# Patient Record
Sex: FEMALE | Race: WHITE | Employment: UNEMPLOYED | URBAN - METROPOLITAN AREA
[De-identification: names, ages, dates, MRNs, and addresses within clinical notes are randomized per-mention and may not be internally consistent; named-entity substitution may affect disease eponyms.]

---

## 2017-03-02 ENCOUNTER — OFFICE VISIT (OUTPATIENT)
Dept: CARDIOLOGY | Age: 57
End: 2017-03-02

## 2017-03-02 VITALS
WEIGHT: 183 LBS | HEIGHT: 59 IN | HEART RATE: 76 BPM | BODY MASS INDEX: 36.89 KG/M2 | RESPIRATION RATE: 12 BRPM | DIASTOLIC BLOOD PRESSURE: 60 MMHG | SYSTOLIC BLOOD PRESSURE: 110 MMHG

## 2017-03-02 DIAGNOSIS — Z98.890 S/P CARDIAC CATHETERIZATION: Primary | ICD-10-CM

## 2017-03-02 DIAGNOSIS — I51.7 CARDIOMEGALY: ICD-10-CM

## 2017-03-02 DIAGNOSIS — Z87.891 HISTORY OF TOBACCO ABUSE: ICD-10-CM

## 2017-03-02 DIAGNOSIS — I42.9 CARDIOMYOPATHY (HCC): ICD-10-CM

## 2017-03-02 DIAGNOSIS — Z95.810 ICD (IMPLANTABLE CARDIOVERTER-DEFIBRILLATOR) IN PLACE: ICD-10-CM

## 2017-03-02 DIAGNOSIS — J44.9 CHRONIC OBSTRUCTIVE PULMONARY DISEASE, UNSPECIFIED COPD TYPE (HCC): ICD-10-CM

## 2017-03-02 PROCEDURE — 99213 OFFICE O/P EST LOW 20 MIN: CPT | Performed by: INTERNAL MEDICINE

## 2017-03-02 ASSESSMENT — ENCOUNTER SYMPTOMS
COUGH: 0
CHEST TIGHTNESS: 0
APNEA: 0
COLOR CHANGE: 0
SHORTNESS OF BREATH: 0

## 2017-03-15 RX ORDER — LISINOPRIL 10 MG/1
10 TABLET ORAL DAILY
Qty: 90 TABLET | Refills: 3 | Status: SHIPPED | OUTPATIENT
Start: 2017-03-15 | End: 2018-02-20 | Stop reason: SDUPTHER

## 2017-03-15 RX ORDER — FUROSEMIDE 40 MG/1
40 TABLET ORAL 2 TIMES DAILY
Qty: 180 TABLET | Refills: 3 | Status: SHIPPED | OUTPATIENT
Start: 2017-03-15 | End: 2018-02-16 | Stop reason: SDUPTHER

## 2017-03-15 RX ORDER — SPIRONOLACTONE 25 MG/1
25 TABLET ORAL DAILY
Qty: 90 TABLET | Refills: 3 | Status: SHIPPED | OUTPATIENT
Start: 2017-03-15 | End: 2018-02-16 | Stop reason: SDUPTHER

## 2017-03-15 RX ORDER — CARVEDILOL 12.5 MG/1
12.5 TABLET ORAL 2 TIMES DAILY WITH MEALS
Qty: 180 TABLET | Refills: 3 | Status: SHIPPED | OUTPATIENT
Start: 2017-03-15 | End: 2017-07-17 | Stop reason: SDUPTHER

## 2017-07-17 RX ORDER — CARVEDILOL 12.5 MG/1
12.5 TABLET ORAL 2 TIMES DAILY WITH MEALS
Qty: 180 TABLET | Refills: 3 | Status: SHIPPED | OUTPATIENT
Start: 2017-07-17 | End: 2018-02-12 | Stop reason: SDUPTHER

## 2017-11-02 ENCOUNTER — OFFICE VISIT (OUTPATIENT)
Dept: CARDIOLOGY | Age: 57
End: 2017-11-02

## 2017-11-02 VITALS
HEIGHT: 59 IN | WEIGHT: 185 LBS | BODY MASS INDEX: 37.29 KG/M2 | HEART RATE: 68 BPM | SYSTOLIC BLOOD PRESSURE: 124 MMHG | DIASTOLIC BLOOD PRESSURE: 80 MMHG | RESPIRATION RATE: 12 BRPM

## 2017-11-02 DIAGNOSIS — R53.83 OTHER FATIGUE: Primary | ICD-10-CM

## 2017-11-02 DIAGNOSIS — Z95.0 S/P PLACEMENT OF CARDIAC PACEMAKER: ICD-10-CM

## 2017-11-02 DIAGNOSIS — Z98.890 S/P CARDIAC CATHETERIZATION: ICD-10-CM

## 2017-11-02 DIAGNOSIS — Z95.810 S/P INTERNAL CARDIAC DEFIBRILLATOR PROCEDURE: ICD-10-CM

## 2017-11-02 PROCEDURE — 99213 OFFICE O/P EST LOW 20 MIN: CPT | Performed by: INTERNAL MEDICINE

## 2017-11-02 ASSESSMENT — ENCOUNTER SYMPTOMS
SHORTNESS OF BREATH: 1
CHEST TIGHTNESS: 0
COLOR CHANGE: 0
APNEA: 0
COUGH: 0

## 2017-11-02 NOTE — PROGRESS NOTES
History:   reports that she quit smoking about 2 years ago. She started smoking about 37 years ago. She has a 25.00 pack-year smoking history. She has never used smokeless tobacco. She reports that she does not drink alcohol or use drugs. Surgical History:  Past Surgical History:   Procedure Laterality Date    CARDIAC DEFIBRILLATOR PLACEMENT      PACEMAKER INSERTION         Family History:  Family history is unknown by patient. Current Medications:    Current Outpatient Prescriptions:     carvedilol (COREG) 12.5 MG tablet, Take 1 tablet by mouth 2 times daily (with meals), Disp: 180 tablet, Rfl: 3    furosemide (LASIX) 40 MG tablet, Take 1 tablet by mouth 2 times daily, Disp: 180 tablet, Rfl: 3    spironolactone (ALDACTONE) 25 MG tablet, Take 1 tablet by mouth daily, Disp: 90 tablet, Rfl: 3    lisinopril (PRINIVIL;ZESTRIL) 10 MG tablet, Take 1 tablet by mouth daily, Disp: 90 tablet, Rfl: 3    aspirin 81 MG tablet, Take 81 mg by mouth daily, Disp: , Rfl:     EPINEPHrine (EPIPEN 2-DAR) 0.3 MG/0.3ML SOAJ injection, Inject 0.3 mLs into the skin once as needed (allergic reaction) Use as directed for allergic reaction, Disp: 2 each, Rfl: 0      Review of Systems:  Review of Systems   Constitutional: Negative for appetite change, diaphoresis and fatigue. Respiratory: Positive for shortness of breath. Negative for apnea, cough and chest tightness. On exertion   Cardiovascular: Negative for chest pain and palpitations. Musculoskeletal: Negative for gait problem. Skin: Negative for color change, pallor, rash and wound. Neurological: Negative for dizziness, syncope, weakness, light-headedness, numbness and headaches. Psychiatric/Behavioral: Negative for agitation. The patient is not nervous/anxious. All other systems reviewed and are negative.         Objective  Vitals:    11/02/17 1115   BP: 124/80   Pulse: 68   Resp: 12   Weight: 185 lb (83.9 kg)   Height: 4' 11\" (1.499 m)         Physical Exam:  Physical Exam        Assessment/Orders:     ICD-10-CM ICD-9-CM    1. Other fatigue R53.83 780.79 Internal Referral to Pacemaker Clinic   2. S/P cardiac catheterization Z98.890 V45.89    3. S/P placement of cardiac pacemaker Z95.0 V45.01 Internal Referral to Pacemaker Clinic   4. S/P internal cardiac defibrillator procedure Z95.810 V45.02 Internal Referral to Pacemaker Clinic       No orders of the defined types were placed in this encounter. There are no discontinued medications. Orders Placed This Encounter   Procedures    Internal Referral to Pacemaker Clinic     Referral Priority:   Routine     Referral Type:   Consult for Advice and Opinion     Referral Reason:   Specialty Services Required     Requested Specialty:   Cardiology     Number of Visits Requested:   1         Plan:  1. Patient to see pacemaker clinic  2. See me in 6 months.  > I will continue to monitor patient clinically, if symptoms develop or worsen, they are to let me know ASAP or head to the nearest emergeny room. > Follow up as discussed or call office sooner if needed.  > If refills are needed after appointment contact pharmacy.         Electronically signed by: Rosalva Perera MD  11/2/2017 11:26 AM

## 2018-02-12 RX ORDER — CARVEDILOL 12.5 MG/1
12.5 TABLET ORAL 2 TIMES DAILY WITH MEALS
Qty: 180 TABLET | Refills: 1 | Status: SHIPPED | OUTPATIENT
Start: 2018-02-12 | End: 2018-07-19 | Stop reason: SDUPTHER

## 2018-02-12 NOTE — TELEPHONE ENCOUNTER
From: Sal Louis  Sent: 2/12/2018 2:49 PM EST  Subject: Medication Renewal Request    Sal Louis would like a refill of the following medications:  carvedilol (COREG) 12.5 MG tablet Selin Kitchen MD]    Preferred pharmacy: Other - [facility] mail order    Comment:  I need to change the above meds scripts sent to [facility] please. . Thank you Sal Louis    Medication renewals requested in this message routed separately:  lisinopril (PRINIVIL;ZESTRIL) 10 MG tablet Selin Kitchen MD]

## 2018-02-16 RX ORDER — FUROSEMIDE 40 MG/1
40 TABLET ORAL 2 TIMES DAILY
Qty: 180 TABLET | Refills: 2 | Status: SHIPPED | OUTPATIENT
Start: 2018-02-16 | End: 2018-03-02 | Stop reason: SDUPTHER

## 2018-02-16 RX ORDER — SPIRONOLACTONE 25 MG/1
25 TABLET ORAL DAILY
Qty: 90 TABLET | Refills: 2 | Status: SHIPPED | OUTPATIENT
Start: 2018-02-16 | End: 2018-03-02 | Stop reason: SDUPTHER

## 2018-02-20 RX ORDER — LISINOPRIL 10 MG/1
10 TABLET ORAL DAILY
Qty: 90 TABLET | Refills: 3 | Status: SHIPPED | OUTPATIENT
Start: 2018-02-20 | End: 2018-10-02 | Stop reason: SDUPTHER

## 2018-02-26 RX ORDER — LISINOPRIL 10 MG/1
10 TABLET ORAL DAILY
Qty: 90 TABLET | Refills: 3 | Status: SHIPPED | OUTPATIENT
Start: 2018-02-26

## 2018-02-26 NOTE — TELEPHONE ENCOUNTER
From: Kailey Riggs  Sent: 2/12/2018 2:49 PM EST  Subject: Medication Renewal Request    Kailey Riggs would like a refill of the following medications:  lisinopril (PRINIVIL;ZESTRIL) 10 MG tablet Rose Marie Ramires MD]    Preferred pharmacy: Other - [facility] mail order    Comment:  I need to change the above meds scripts sent to [facility] please. . Thank you Kailey Riggs    Medication renewals requested in this message routed separately:  carvedilol (COREG) 12.5 MG tablet Rose Marie Ramires MD]

## 2018-03-02 RX ORDER — FUROSEMIDE 40 MG/1
40 TABLET ORAL 2 TIMES DAILY
Qty: 180 TABLET | Refills: 2 | Status: SHIPPED | OUTPATIENT
Start: 2018-03-02

## 2018-03-02 RX ORDER — SPIRONOLACTONE 25 MG/1
25 TABLET ORAL DAILY
Qty: 90 TABLET | Refills: 2 | Status: SHIPPED | OUTPATIENT
Start: 2018-03-02 | End: 2018-10-02 | Stop reason: SDUPTHER

## 2018-03-02 RX ORDER — EPINEPHRINE 0.3 MG/.3ML
0.3 INJECTION SUBCUTANEOUS
Qty: 2 EACH | Refills: 0 | OUTPATIENT
Start: 2018-03-02 | End: 2018-03-02

## 2018-03-02 NOTE — TELEPHONE ENCOUNTER
From: Coty Alicia  Sent: 3/1/2018 5:11 PM EST  Subject: Medication Renewal Request    Coty Alicia would like a refill of the following medications:     EPINEPHrine (EPIPEN 2-DAR) 0.3 MG/0.3ML SOAJ injection Waunita Pellet, DO]    Preferred pharmacy: [address] [place] [address] [phone]    Comment:  I need refills on these meds but [facility]. The fax number there is [phone] or call [phone]. .thank you.     Medication renewals requested in this message routed separately:     furosemide (LASIX) 40 MG tablet Tila Winters MD]     spironolactone (ALDACTONE) 25 MG tablet Tila Winters MD]

## 2018-07-19 RX ORDER — CARVEDILOL 12.5 MG/1
TABLET ORAL
Qty: 180 TABLET | Refills: 3 | Status: SHIPPED | OUTPATIENT
Start: 2018-07-19

## 2018-10-02 RX ORDER — FUROSEMIDE 40 MG/1
TABLET ORAL
Qty: 180 TABLET | Refills: 3 | Status: SHIPPED | OUTPATIENT
Start: 2018-10-02

## 2018-10-02 RX ORDER — SPIRONOLACTONE 25 MG/1
25 TABLET ORAL DAILY
Qty: 90 TABLET | Refills: 3 | Status: SHIPPED | OUTPATIENT
Start: 2018-10-02

## 2019-04-10 RX ORDER — LISINOPRIL 10 MG/1
TABLET ORAL
Qty: 90 TABLET | Refills: 3 | OUTPATIENT
Start: 2019-04-10

## 2019-06-12 ENCOUNTER — TELEPHONE (OUTPATIENT)
Dept: ADMINISTRATIVE | Age: 59
End: 2019-06-12

## 2019-06-12 NOTE — TELEPHONE ENCOUNTER
Patient received a robo/outreach call. Patient is undecided at this time, advised patient to please contact us if she is interested in scheduling with [facility].

## 2023-03-06 LAB
ANION GAP IN SER/PLAS: 12 MMOL/L (ref 10–20)
CALCIUM (MG/DL) IN SER/PLAS: 9.8 MG/DL (ref 8.6–10.3)
CARBON DIOXIDE, TOTAL (MMOL/L) IN SER/PLAS: 24 MMOL/L (ref 21–32)
CHLORIDE (MMOL/L) IN SER/PLAS: 102 MMOL/L (ref 98–107)
CREATININE (MG/DL) IN SER/PLAS: 0.89 MG/DL (ref 0.5–1.05)
GFR FEMALE: 72 ML/MIN/1.73M2
GLUCOSE (MG/DL) IN SER/PLAS: 167 MG/DL (ref 74–99)
POTASSIUM (MMOL/L) IN SER/PLAS: 4.1 MMOL/L (ref 3.5–5.3)
SODIUM (MMOL/L) IN SER/PLAS: 134 MMOL/L (ref 136–145)
UREA NITROGEN (MG/DL) IN SER/PLAS: 18 MG/DL (ref 6–23)

## 2023-07-10 ENCOUNTER — OFFICE VISIT (OUTPATIENT)
Dept: PRIMARY CARE | Facility: CLINIC | Age: 63
End: 2023-07-10
Payer: MEDICARE

## 2023-07-10 VITALS
BODY MASS INDEX: 35.56 KG/M2 | WEIGHT: 176.4 LBS | DIASTOLIC BLOOD PRESSURE: 78 MMHG | TEMPERATURE: 97.7 F | SYSTOLIC BLOOD PRESSURE: 122 MMHG | HEIGHT: 59 IN | RESPIRATION RATE: 16 BRPM | OXYGEN SATURATION: 96 % | HEART RATE: 97 BPM

## 2023-07-10 DIAGNOSIS — G56.01 CARPAL TUNNEL SYNDROME OF RIGHT WRIST: Primary | ICD-10-CM

## 2023-07-10 DIAGNOSIS — M25.531 ARTHRALGIA OF RIGHT WRIST: ICD-10-CM

## 2023-07-10 DIAGNOSIS — M25.531 WRIST PAIN, ACUTE, RIGHT: ICD-10-CM

## 2023-07-10 DIAGNOSIS — E66.9 CLASS 2 OBESITY WITH BODY MASS INDEX (BMI) OF 36.0 TO 36.9 IN ADULT, UNSPECIFIED OBESITY TYPE, UNSPECIFIED WHETHER SERIOUS COMORBIDITY PRESENT: ICD-10-CM

## 2023-07-10 PROCEDURE — 99213 OFFICE O/P EST LOW 20 MIN: CPT | Performed by: NURSE PRACTITIONER

## 2023-07-10 RX ORDER — DAPAGLIFLOZIN 10 MG/1
10 TABLET, FILM COATED ORAL DAILY
COMMUNITY
Start: 2023-06-26 | End: 2024-02-05

## 2023-07-10 RX ORDER — PREDNISONE 10 MG/1
TABLET ORAL
Qty: 30 TABLET | Refills: 0 | Status: SHIPPED | OUTPATIENT
Start: 2023-07-10 | End: 2023-07-20

## 2023-07-10 RX ORDER — SPIRONOLACTONE 50 MG/1
50 TABLET, FILM COATED ORAL DAILY
COMMUNITY
End: 2023-10-19 | Stop reason: WASHOUT

## 2023-07-10 RX ORDER — SACUBITRIL AND VALSARTAN 24; 26 MG/1; MG/1
1 TABLET, FILM COATED ORAL 2 TIMES DAILY
COMMUNITY
Start: 2020-03-26 | End: 2024-04-29 | Stop reason: SDUPTHER

## 2023-07-10 RX ORDER — SPIRONOLACTONE 25 MG/1
25 TABLET ORAL DAILY
COMMUNITY
Start: 2022-07-13 | End: 2024-04-29 | Stop reason: SDUPTHER

## 2023-07-10 RX ORDER — FUROSEMIDE 40 MG/1
40 TABLET ORAL DAILY
COMMUNITY
End: 2024-04-29 | Stop reason: SDUPTHER

## 2023-07-10 RX ORDER — DICLOFENAC SODIUM 75 MG/1
75 TABLET, DELAYED RELEASE ORAL 2 TIMES DAILY PRN
Qty: 60 TABLET | Refills: 0 | Status: SHIPPED | OUTPATIENT
Start: 2023-07-10 | End: 2023-09-08

## 2023-07-10 RX ORDER — FLUCONAZOLE 150 MG/1
150 TABLET ORAL NIGHTLY
COMMUNITY
Start: 2023-06-16 | End: 2024-04-15 | Stop reason: SDUPTHER

## 2023-07-10 RX ORDER — EPINEPHRINE 0.3 MG/.3ML
1 INJECTION SUBCUTANEOUS AS NEEDED
COMMUNITY
Start: 2020-07-29

## 2023-07-10 RX ORDER — CARVEDILOL 12.5 MG/1
12.5 TABLET ORAL 2 TIMES DAILY
COMMUNITY
End: 2024-04-11 | Stop reason: SDUPTHER

## 2023-07-10 ASSESSMENT — PATIENT HEALTH QUESTIONNAIRE - PHQ9
1. LITTLE INTEREST OR PLEASURE IN DOING THINGS: NOT AT ALL
SUM OF ALL RESPONSES TO PHQ9 QUESTIONS 1 AND 2: 0
2. FEELING DOWN, DEPRESSED OR HOPELESS: NOT AT ALL

## 2023-07-10 ASSESSMENT — ENCOUNTER SYMPTOMS
DEPRESSION: 0
LOSS OF SENSATION IN FEET: 0
BACK PAIN: 1
OCCASIONAL FEELINGS OF UNSTEADINESS: 0

## 2023-07-10 NOTE — PATIENT INSTRUCTIONS
DISCHARGE SUMMARY:   Patient was seen and examined. Diagnosis, treatment, treatment options, and possible complications of today's illness discussed and explained to patient. Patient to buy and use Cockup wrist splint. Patient to take medication/s associated with this visit. Advised to come back if with worsening or persistent symptoms. Patient verbalized understanding of plan of care.    Patient to come back in 7 - 10 days if needed for worsening symptoms.

## 2023-07-10 NOTE — PROGRESS NOTES
"Subjective   Patient ID: Sandee York is a 63 y.o. female who presents for Back Pain and Arm Pain.    Back Pain  This is a new problem. The current episode started today. The problem occurs constantly. The problem is unchanged. The pain is present in the sacro-iliac. The quality of the pain is described as stabbing. The pain does not radiate. The pain is at a severity of 7/10. The pain is moderate. The pain is The same all the time. The symptoms are aggravated by bending and twisting. She has tried ice, muscle relaxant, NSAIDs and analgesics for the symptoms. The treatment provided no relief.   Arm Pain   The incident occurred 12 to 24 hours ago. The incident occurred at home. The injury mechanism is unknown. The pain is present in the right hand and right forearm. The quality of the pain is described as aching. The pain radiates to the right arm and right hand. The pain is at a severity of 8/10. The pain is severe. The pain has been Constant since the incident. She has tried acetaminophen and NSAIDs for the symptoms. The treatment provided no relief.    Back Pain    Arm Pain   The incident occurred 12 to 24 hours ago. The incident occurred at home. There was no injury mechanism. The pain is present in the right forearm, right wrist and upper right arm. The quality of the pain is described as aching. The pain radiates to the right arm and right hand. The pain is at a severity of 8/10. The pain is severe. The pain has been Constant since the incident. Nothing aggravates the symptoms. The treatment provided no relief.       Review of Systems   Musculoskeletal:  Positive for back pain.       Objective   /78   Pulse 97   Temp 36.5 °C (97.7 °F) (Temporal)   Resp 16   Ht 1.486 m (4' 10.5\")   Wt 80 kg (176 lb 6.4 oz)   SpO2 96%   BMI 36.24 kg/m²     Physical Exam    Assessment/Plan          "

## 2023-07-10 NOTE — PROGRESS NOTES
Subjective   Patient ID: Sandee York is a 63 y.o. female who is with complaint of pain and tenderness on the right wrist and forearm.     HPI  Patient is a 63 y.o. female who CONSULTED AT [facility] today. Patient is with complaints of pain and tenderness on the right wrist and forearm. Patient states symptoms has been going on for 2 days. Patient has OTC medication and muscle relaxant for relief of symptoms. Patient states the pain is on the palmar side of his right wrist joint, achy in character, intermittent, aggravated by movement, and radiating to the forearm (anterior aspect). she denies fever, chills, paresthesia, paralysis, nor change in the color of the skin or nails of involved extremity.     Review of Systems  General: no weight loss, generally healthy, no fatigue  Head:  no headaches / sinus pain, no vertigo, no injury  Eyes: no diplopia, no tearing, no pain,   Ears: no change in hearing, no tinnitus, no bleeding, no vertigo  Mouth:  no dental difficulties, no gingival bleeding, no sore throat, no loss of sense of taste  Nose: no congestion, no  discharge, no bleeding, no obstruction, no loss of sense of smell  Neck: no stiffness, no pain, no tenderness, no masses, no bruit  Pulmonary: no dyspnea, no wheezing, no hemoptysis, no cough  Cardiovascular: no chest pain, no palpitations, no syncope, no orthopnea  Gastrointestinal: no change in appetite, no dysphagia, no abdominal pains, no diarrhea, no emesis, no melena  Genito Urinary: no dysuria, no urinary urgency, no nocturia, no incontinence, no change in nature of urine  Musculoskeletal: (+) pain and tenderness on the right wrist and forearm, no limitation of range of motion, no paresthesia, no numbness  Constitutional: no fever, no chills, no night sweats    Objective   Physical Exam  General: ambulatory, in no acute distress  Head: normocephalic, no lesions  Neck: supple, no masses, no bruits  Musculoskeletal: no  limitation of range of motion, no paralysis, no deformity;   Extremities: RIGHT UE: (+) direct tenderness on anterior wrist area, (+) Tinel's sign, (+) Phalen's test full and equal peripheral pulses, no edema, < 2 seconds capillary refill on all toes    Assessment/Plan   Problem List Items Addressed This Visit    None  Visit Diagnoses       Carpal tunnel syndrome of right wrist    -  Primary    Relevant Medications    predniSONE (Deltasone) 10 mg tablet    diclofenac (Voltaren) 75 mg EC tablet    Arthralgia of right wrist        Relevant Medications    predniSONE (Deltasone) 10 mg tablet    diclofenac (Voltaren) 75 mg EC tablet    Wrist pain, acute, right        Relevant Medications    predniSONE (Deltasone) 10 mg tablet    diclofenac (Voltaren) 75 mg EC tablet        DISCHARGE SUMMARY:   Patient was seen and examined. Diagnosis, treatment, treatment options, and possible complications of today's illness discussed and explained to patient. Patient to buy and use Cockup wrist splint. Patient to take medication/s associated with this visit. Advised to come back if with worsening or persistent symptoms. Patient verbalized understanding of plan of care.    Patient to come back in 7 - 10 days if needed for worsening symptoms.

## 2023-09-19 PROBLEM — N18.30 CKD (CHRONIC KIDNEY DISEASE) STAGE 3, GFR 30-59 ML/MIN (MULTI): Status: ACTIVE | Noted: 2023-09-19

## 2023-09-19 PROBLEM — I42.8 OTHER CARDIOMYOPATHIES (MULTI): Status: ACTIVE | Noted: 2018-03-15

## 2023-09-19 PROBLEM — I50.22 CHRONIC SYSTOLIC HEART FAILURE (MULTI): Status: ACTIVE | Noted: 2023-09-19

## 2023-09-19 PROBLEM — I51.9 MILD DIASTOLIC DYSFUNCTION: Status: ACTIVE | Noted: 2023-09-19

## 2023-09-19 PROBLEM — E78.5 HYPERLIPIDEMIA: Status: ACTIVE | Noted: 2023-09-19

## 2023-09-19 PROBLEM — R73.01 IMPAIRED FASTING GLUCOSE: Status: ACTIVE | Noted: 2023-09-19

## 2023-09-19 PROBLEM — R60.9 SWELLING: Status: ACTIVE | Noted: 2023-09-19

## 2023-09-19 PROBLEM — I10 HTN (HYPERTENSION): Status: ACTIVE | Noted: 2023-09-19

## 2023-09-19 PROBLEM — I42.8 NONISCHEMIC CARDIOMYOPATHY (MULTI): Status: ACTIVE | Noted: 2023-09-19

## 2023-09-19 PROBLEM — E66.812 OBESITY, CLASS II, BMI 35-39.9: Status: ACTIVE | Noted: 2018-07-02

## 2023-09-19 PROBLEM — I50.9 CHF (CONGESTIVE HEART FAILURE) (MULTI): Status: ACTIVE | Noted: 2023-09-19

## 2023-09-19 PROBLEM — I44.7 LBBB (LEFT BUNDLE BRANCH BLOCK): Status: ACTIVE | Noted: 2023-09-19

## 2023-09-19 PROBLEM — D06.9 CIN III (CERVICAL INTRAEPITHELIAL NEOPLASIA GRADE III) WITH SEVERE DYSPLASIA: Status: ACTIVE | Noted: 2018-07-13

## 2023-09-19 PROBLEM — I25.10 CAD (CORONARY ARTERY DISEASE), NATIVE CORONARY ARTERY: Status: ACTIVE | Noted: 2023-09-19

## 2023-09-19 PROBLEM — Z87.891 FORMER SMOKER: Status: ACTIVE | Noted: 2023-09-19

## 2023-09-19 PROBLEM — Z95.0 PACEMAKER: Status: ACTIVE | Noted: 2023-09-19

## 2023-09-19 PROBLEM — B35.2 TINEA MANUUM: Status: ACTIVE | Noted: 2023-09-19

## 2023-09-19 PROBLEM — Z95.810 PRESENCE OF COMBINATION INTERNAL CARDIAC DEFIBRILLATOR (ICD) AND PACEMAKER: Status: ACTIVE | Noted: 2023-09-19

## 2023-09-19 PROBLEM — R91.1 LUNG NODULE: Status: ACTIVE | Noted: 2023-09-19

## 2023-09-19 PROBLEM — Z95.810 BIVENTRICULAR ICD (IMPLANTABLE CARDIOVERTER-DEFIBRILLATOR) IN PLACE: Status: ACTIVE | Noted: 2023-09-19

## 2023-09-19 PROBLEM — R73.03 PRE-DIABETES: Status: ACTIVE | Noted: 2023-09-19

## 2023-09-19 PROBLEM — R60.9 WATER RETENTION: Status: ACTIVE | Noted: 2023-09-19

## 2023-09-19 PROBLEM — E78.2 COMBINED HYPERLIPIDEMIA: Status: ACTIVE | Noted: 2023-09-19

## 2023-09-19 PROBLEM — H60.90 OTITIS EXTERNA: Status: ACTIVE | Noted: 2023-09-19

## 2023-09-19 PROBLEM — I25.2 HISTORY OF MYOCARDIAL INFARCTION: Status: ACTIVE | Noted: 2023-09-19

## 2023-09-19 PROBLEM — E66.9 OBESITY, CLASS II, BMI 35-39.9: Status: ACTIVE | Noted: 2018-07-02

## 2023-09-19 RX ORDER — ASPIRIN 81 MG/1
1 TABLET ORAL DAILY
COMMUNITY

## 2023-09-19 RX ORDER — LISINOPRIL 10 MG/1
1 TABLET ORAL DAILY
COMMUNITY
Start: 2018-02-26 | End: 2024-01-30 | Stop reason: WASHOUT

## 2023-09-19 RX ORDER — ALBUTEROL SULFATE 0.83 MG/ML
2.5 SOLUTION RESPIRATORY (INHALATION)
COMMUNITY
Start: 2015-09-13 | End: 2023-10-19 | Stop reason: WASHOUT

## 2023-09-19 RX ORDER — ATORVASTATIN CALCIUM 80 MG/1
1 TABLET, FILM COATED ORAL NIGHTLY
COMMUNITY
Start: 2019-02-05 | End: 2023-10-19 | Stop reason: WASHOUT

## 2023-10-19 ENCOUNTER — OFFICE VISIT (OUTPATIENT)
Dept: CARDIOLOGY | Facility: CLINIC | Age: 63
End: 2023-10-19
Payer: MEDICARE

## 2023-10-19 VITALS
OXYGEN SATURATION: 96 % | BODY MASS INDEX: 33.87 KG/M2 | HEIGHT: 59 IN | SYSTOLIC BLOOD PRESSURE: 110 MMHG | DIASTOLIC BLOOD PRESSURE: 80 MMHG | HEART RATE: 75 BPM | WEIGHT: 168 LBS

## 2023-10-19 DIAGNOSIS — I44.7 LBBB (LEFT BUNDLE BRANCH BLOCK): ICD-10-CM

## 2023-10-19 DIAGNOSIS — I25.10 CORONARY ARTERY DISEASE INVOLVING NATIVE CORONARY ARTERY OF NATIVE HEART WITHOUT ANGINA PECTORIS: ICD-10-CM

## 2023-10-19 DIAGNOSIS — I50.22 CHRONIC SYSTOLIC HEART FAILURE (MULTI): Primary | ICD-10-CM

## 2023-10-19 PROCEDURE — 1160F RVW MEDS BY RX/DR IN RCRD: CPT | Performed by: INTERNAL MEDICINE

## 2023-10-19 PROCEDURE — 99214 OFFICE O/P EST MOD 30 MIN: CPT | Performed by: INTERNAL MEDICINE

## 2023-10-19 PROCEDURE — 3079F DIAST BP 80-89 MM HG: CPT | Performed by: INTERNAL MEDICINE

## 2023-10-19 PROCEDURE — 1036F TOBACCO NON-USER: CPT | Performed by: INTERNAL MEDICINE

## 2023-10-19 PROCEDURE — 1159F MED LIST DOCD IN RCRD: CPT | Performed by: INTERNAL MEDICINE

## 2023-10-19 PROCEDURE — 3008F BODY MASS INDEX DOCD: CPT | Performed by: INTERNAL MEDICINE

## 2023-10-19 PROCEDURE — 3074F SYST BP LT 130 MM HG: CPT | Performed by: INTERNAL MEDICINE

## 2023-10-19 ASSESSMENT — ENCOUNTER SYMPTOMS
OCCASIONAL FEELINGS OF UNSTEADINESS: 0
LOSS OF SENSATION IN FEET: 0
DEPRESSION: 0

## 2023-10-19 NOTE — PROGRESS NOTES
Name : Sandee York   : 1960   MRN : [id number]   ENC Date : 10/19/2023      Assessment and Plan:  Chronic systolic heart failure: LVEF has normalized.  Patient is NYHA class II at worst.  Volume status is normal.  No need to add any medications.  Continue current medical regimen.  Coronary artery disease: No angina.  No need for stress testing.  Patient did not tolerate statin as she believes it raised her blood sugar.  She does not want to try a different statin at this time.  Disp: RTO in 6 months    HPI:  Patient returns today doing reasonably well.  She has some mild dyspnea with exertion but no chest discomfort.  No syncopal events.  No TIA or CVA-like symptoms.  No orthopnea nor PND.    Problem list overview:   Patient Active Problem List   Diagnosis    CAD (coronary artery disease), native coronary artery    Cardiomegaly    Chronic systolic heart failure (CMS/HCC)    JENNY III (cervical intraepithelial neoplasia grade III) with severe dysplasia    CKD (chronic kidney disease) stage 3, GFR 30-59 ml/min (CMS/HCC)    COPD (chronic obstructive pulmonary disease) (CMS/HCC)    History of myocardial infarction    HTN (hypertension)    Combined hyperlipidemia    Hyperlipidemia    Impaired fasting glucose    CHF (congestive heart failure) (CMS/HCC)    LBBB (left bundle branch block)    Lung nodule    Mild diastolic dysfunction    Obesity, Class II, BMI 35-39.9    Other cardiomyopathies (CMS/HCC)    Otitis externa    Pacemaker    Presence of combination internal cardiac defibrillator (ICD) and pacemaker    Pre-diabetes    Tinea    Onychomycosis    Tinea manuum    Urge incontinence    Swelling    Water retention    Biventricular ICD (implantable cardioverter-defibrillator) in place    Former smoker    Nonischemic cardiomyopathy (CMS/HCC)       Meds:   Current Outpatient Medications on File Prior to Visit   Medication Sig Dispense Refill    aspirin 81 mg EC tablet Take 1 tablet (81 mg) by mouth once daily.    "   carvedilol (Coreg) 12.5 mg tablet Take 1 tablet (12.5 mg) by mouth 2 times a day.      Entresto 24-26 mg tablet Take 1 tablet by mouth 2 times a day.      EPINEPHrine 0.3 mg/0.3 mL injection syringe Inject 0.3 mL (0.3 mg) as directed if needed.      Farxiga 10 mg Take 1 tablet (10 mg) by mouth once daily.      fluconazole (Diflucan) 150 mg tablet Take 1 tablet (150 mg) by mouth once daily at bedtime.      furosemide (Lasix) 40 mg tablet Take 1 tablet (40 mg) by mouth once daily.      lisinopril 10 mg tablet Take 1 tablet (10 mg) by mouth once daily.      spironolactone (Aldactone) 25 mg tablet Take 1 tablet (25 mg) by mouth once daily.      [DISCONTINUED] albuterol 2.5 mg /3 mL (0.083 %) nebulizer solution 3 mL (2.5 mg).      [DISCONTINUED] atorvastatin (Lipitor) 80 mg tablet Take 1 tablet (80 mg) by mouth once daily at bedtime.      [DISCONTINUED] spironolactone (Aldactone) 50 mg tablet Take 1 tablet (50 mg) by mouth once daily.       No current facility-administered medications on file prior to visit.        VS:  /80 (BP Location: Right arm, Patient Position: Sitting)   Pulse 75   Ht 1.499 m (4' 11\")   Wt 76.2 kg (168 lb)   SpO2 96%   BMI 33.93 kg/m²     Physical Exam    ECG: No results found for this or any previous visit.   ECHO: No results found for this or any previous visit from the past 1095 days.          Gurpreet Ray MD  "

## 2023-11-02 ENCOUNTER — HOSPITAL ENCOUNTER (OUTPATIENT)
Dept: CARDIOLOGY | Facility: HOSPITAL | Age: 63
Discharge: HOME | End: 2023-11-02
Payer: MEDICARE

## 2023-11-02 DIAGNOSIS — I42.0 PRIMARY IDIOPATHIC DILATED CARDIOMYOPATHY (MULTI): ICD-10-CM

## 2023-11-02 DIAGNOSIS — Z95.810 ICD (IMPLANTABLE CARDIOVERTER-DEFIBRILLATOR) IN PLACE: Primary | ICD-10-CM

## 2023-11-02 DIAGNOSIS — Z95.810 ICD (IMPLANTABLE CARDIOVERTER-DEFIBRILLATOR) IN PLACE: ICD-10-CM

## 2023-11-02 PROCEDURE — 93295 DEV INTERROG REMOTE 1/2/MLT: CPT | Performed by: INTERNAL MEDICINE

## 2023-11-02 PROCEDURE — 93296 REM INTERROG EVL PM/IDS: CPT

## 2024-01-30 ENCOUNTER — OFFICE VISIT (OUTPATIENT)
Dept: CARDIOLOGY | Facility: CLINIC | Age: 64
End: 2024-01-30
Payer: MEDICARE

## 2024-01-30 ENCOUNTER — ANCILLARY PROCEDURE (OUTPATIENT)
Dept: CARDIOLOGY | Facility: CLINIC | Age: 64
End: 2024-01-30
Payer: MEDICARE

## 2024-01-30 VITALS
HEIGHT: 59 IN | TEMPERATURE: 97.3 F | HEART RATE: 72 BPM | SYSTOLIC BLOOD PRESSURE: 110 MMHG | DIASTOLIC BLOOD PRESSURE: 70 MMHG | WEIGHT: 170.8 LBS | BODY MASS INDEX: 34.43 KG/M2

## 2024-01-30 DIAGNOSIS — Z95.810 ICD (IMPLANTABLE CARDIOVERTER-DEFIBRILLATOR) IN PLACE: ICD-10-CM

## 2024-01-30 DIAGNOSIS — I42.9 PRIMARY CARDIOMYOPATHY (MULTI): ICD-10-CM

## 2024-01-30 DIAGNOSIS — I42.0 PRIMARY IDIOPATHIC DILATED CARDIOMYOPATHY (MULTI): ICD-10-CM

## 2024-01-30 DIAGNOSIS — Z95.810 BIVENTRICULAR ICD (IMPLANTABLE CARDIOVERTER-DEFIBRILLATOR) IN PLACE: Primary | ICD-10-CM

## 2024-01-30 DIAGNOSIS — Z95.810 PRESENCE OF AUTOMATIC CARDIOVERTER/DEFIBRILLATOR (AICD): ICD-10-CM

## 2024-01-30 PROCEDURE — 1159F MED LIST DOCD IN RCRD: CPT | Performed by: INTERNAL MEDICINE

## 2024-01-30 PROCEDURE — 3074F SYST BP LT 130 MM HG: CPT | Performed by: INTERNAL MEDICINE

## 2024-01-30 PROCEDURE — 93290 INTERROG DEV EVAL ICPMS IP: CPT | Performed by: INTERNAL MEDICINE

## 2024-01-30 PROCEDURE — 99213 OFFICE O/P EST LOW 20 MIN: CPT | Performed by: INTERNAL MEDICINE

## 2024-01-30 PROCEDURE — 93284 PRGRMG EVAL IMPLANTABLE DFB: CPT | Performed by: INTERNAL MEDICINE

## 2024-01-30 PROCEDURE — 1036F TOBACCO NON-USER: CPT | Performed by: INTERNAL MEDICINE

## 2024-01-30 PROCEDURE — 3078F DIAST BP <80 MM HG: CPT | Performed by: INTERNAL MEDICINE

## 2024-01-30 PROCEDURE — 3008F BODY MASS INDEX DOCD: CPT | Performed by: INTERNAL MEDICINE

## 2024-01-30 ASSESSMENT — PATIENT HEALTH QUESTIONNAIRE - PHQ9
2. FEELING DOWN, DEPRESSED OR HOPELESS: NOT AT ALL
SUM OF ALL RESPONSES TO PHQ9 QUESTIONS 1 AND 2: 0
1. LITTLE INTEREST OR PLEASURE IN DOING THINGS: NOT AT ALL

## 2024-01-30 NOTE — PROGRESS NOTES
Subjective:  The patient is a 64-year-old white female, followed primarily by Dr. Blayne Castellano, who presents for biventricular ICD follow-up.  She was managed in the past by cardiologist Dr. Braden Lee and is now scheduled to see Dr. Gurpreet Ray.  She has a nonischemic cardiomyopathy with an LVEF of 25% and complete left bundle branch block.  She underwent [facility] biventricular DDDR ICD placement by Dr. Robb Fontaine in 2016, after which her LVEF improved to as high as 52% but settled at 40-45%.  She does not report heart failure symptoms.    The patient is  and lives at home with her .  She finished high school in .  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    The patient's daughter  4 years ago of carbon monoxide poisoning, and she looks after her grandchildren because of this.  She also helps look after her 88-year-old mother-in-law who is in failing health.    The patient reports intermittent sharp discomfort in her left shoulder rating to her neck, and occasionally into her chest.  None of the symptoms are exertional in any way.    Current Outpatient Medications   Medication Sig    aspirin 81 mg EC tablet Take 1 tablet (81 mg) by mouth once daily.    carvedilol (Coreg) 12.5 mg tablet Take 1 tablet (12.5 mg) by mouth 2 times a day.    Entresto 24-26 mg tablet Take 1 tablet by mouth 2 times a day.    EPINEPHrine 0.3 mg/0.3 mL injection syringe Inject 0.3 mL (0.3 mg) as directed if needed.    Farxiga 10 mg Take 1 tablet (10 mg) by mouth once daily.    fluconazole (Diflucan) 150 mg tablet Take 1 tablet (150 mg) by mouth once daily at bedtime.    furosemide (Lasix) 40 mg tablet Take 1 tablet (40 mg) by mouth once daily.    spironolactone (Aldactone) 25 mg tablet Take 1 tablet (25 mg) by mouth once daily.    lisinopril 10 mg tablet Take 1 tablet (10 mg) by mouth  "once daily.     Allergies:  Bee venom protein (honey bee)     Patient Active Problem List   Diagnosis    CAD (coronary artery disease), native coronary artery    Cardiomegaly    Chronic systolic heart failure (CMS/HCC)    JENNY III (cervical intraepithelial neoplasia grade III) with severe dysplasia    CKD (chronic kidney disease) stage 3, GFR 30-59 ml/min (CMS/HCC)    COPD (chronic obstructive pulmonary disease) (CMS/HCC)    History of myocardial infarction    HTN (hypertension)    Combined hyperlipidemia    Hyperlipidemia    Impaired fasting glucose    CHF (congestive heart failure) (CMS/HCC)    LBBB (left bundle branch block)    Lung nodule    Mild diastolic dysfunction    Obesity, Class II, BMI 35-39.9    Other cardiomyopathies (CMS/HCC)    Otitis externa    Pacemaker    Presence of combination internal cardiac defibrillator (ICD) and pacemaker    Pre-diabetes    Tinea    Onychomycosis    Tinea manuum    Urge incontinence    Swelling    Water retention    Biventricular ICD (implantable cardioverter-defibrillator) in place    Former smoker    Nonischemic cardiomyopathy (CMS/HCC)     Past Surgical History:   Procedure Laterality Date    OTHER SURGICAL HISTORY  01/13/2020    Pacemaker insertion     Objective:  Vitals:    01/30/24 1301   BP: 110/70   Pulse: 72   Temp: 36.3 °C (97.3 °F)   Height:     1.499 m (4' 11\")  Weight: 77.5 kg (170 lb 12.8 oz)     Exam:  Gen: Pleasant middle-age woman in no distress.  HEENT: No scleral icterus.  Neck: No jugular venous distention or thyromegaly.  Left subclavian ICD pocket: Normal in appearance.  Lungs: Clear to auscultation, with no wheezes or rales.  Heart: Regular rhythm without murmurs or gallops.  Abdomen: Benign, with no organomegaly or masses.  Extremities: Intact distal pulses; no edema.  Neuro: No focal neurologic abnormalities.  Skin: No cutaneous lesions.    Device Check:  A biventricular ICD check was done.  The parameters on all 3 leads were good.  The unit is " programmed for DDDR pacing between 60 bpm and 120 bpm, which provides 18% atrial pacing and over 99% CRT in the ventricles.  There were a few brief spells of atrial fibrillation, the longest lasting just 2 minutes in duration.  There were no ICD shocks.  The device battery longevity is estimated at 1.6 years.    Impressions:  1.  Nonischemic cardiomyopathy, with NYHA functional class I-II heart failure.  2.  Complete left bundle branch block with ventricular dyssynchrony.  3.  Status post [facility]ip biventricular DDDR ICD in 2016, with normal device function and improvement in LV function after device placement.  4.  Atypical chest pain, likely nonischemic in etiology.  5.  Other medical problems, including mild obesity, hyperlipidemia, and stage III chronic kidney disease, followed by Dr. Castellano.    Recommendations:  1.  The patient's device will be followed remotely every 3 months.  2.  She will see me on an annual basis for ICD checks, and will likely need a generator replacement sometime in late 2025.  3.  She will follow-up with cardiologist Dr. Gurpreet Ray, who will consider functional testing to better exclude any ischemic origin to her atypical chest pain symptoms.      Travis Simmons MD

## 2024-02-05 DIAGNOSIS — I15.9 SECONDARY HYPERTENSION: ICD-10-CM

## 2024-02-05 DIAGNOSIS — I50.22 CHRONIC SYSTOLIC HEART FAILURE (MULTI): ICD-10-CM

## 2024-02-05 RX ORDER — DAPAGLIFLOZIN 10 MG/1
10 TABLET, FILM COATED ORAL DAILY
Qty: 30 TABLET | Refills: 11 | Status: SHIPPED | OUTPATIENT
Start: 2024-02-05 | End: 2024-05-30 | Stop reason: SDUPTHER

## 2024-02-05 RX ORDER — DAPAGLIFLOZIN 10 MG/1
10 TABLET, FILM COATED ORAL DAILY
Qty: 120 TABLET | Refills: 0 | Status: SHIPPED | OUTPATIENT
Start: 2024-02-05 | End: 2024-04-29 | Stop reason: WASHOUT

## 2024-04-11 DIAGNOSIS — I10 ESSENTIAL HYPERTENSION, BENIGN: ICD-10-CM

## 2024-04-11 RX ORDER — CARVEDILOL 12.5 MG/1
12.5 TABLET ORAL 2 TIMES DAILY
Qty: 60 TABLET | Refills: 3 | Status: SHIPPED | OUTPATIENT
Start: 2024-04-11 | End: 2024-04-29 | Stop reason: SDUPTHER

## 2024-04-11 NOTE — TELEPHONE ENCOUNTER
Pt requesting refill for    carvedilol (Coreg) 12.5 mg tablet , 2 times daily, 90 day supply. Sent to [facility] at [address]. Thanks

## 2024-04-15 DIAGNOSIS — E66.9 OBESITY, CLASS II, BMI 35-39.9: Primary | ICD-10-CM

## 2024-04-15 RX ORDER — FLUCONAZOLE 150 MG/1
150 TABLET ORAL NIGHTLY
Qty: 14 TABLET | Refills: 0 | Status: SHIPPED | OUTPATIENT
Start: 2024-04-15 | End: 2024-04-29

## 2024-04-15 NOTE — TELEPHONE ENCOUNTER
Sandee called this morning; she needs refills on Fluconazole 150mg. She said she uses it for yeast infections every once in a while since Farxiga and that you are the provider that prescribed it. Please advise.

## 2024-04-29 ENCOUNTER — TELEPHONE (OUTPATIENT)
Dept: CARDIOLOGY | Facility: HOSPITAL | Age: 64
End: 2024-04-29

## 2024-04-29 ENCOUNTER — OFFICE VISIT (OUTPATIENT)
Dept: CARDIOLOGY | Facility: CLINIC | Age: 64
End: 2024-04-29
Payer: MEDICARE

## 2024-04-29 VITALS
HEIGHT: 59 IN | SYSTOLIC BLOOD PRESSURE: 112 MMHG | OXYGEN SATURATION: 97 % | WEIGHT: 172 LBS | DIASTOLIC BLOOD PRESSURE: 68 MMHG | HEART RATE: 81 BPM | BODY MASS INDEX: 34.68 KG/M2

## 2024-04-29 DIAGNOSIS — I25.10 CORONARY ARTERY DISEASE INVOLVING NATIVE CORONARY ARTERY OF NATIVE HEART WITHOUT ANGINA PECTORIS: ICD-10-CM

## 2024-04-29 DIAGNOSIS — I10 ESSENTIAL HYPERTENSION, BENIGN: ICD-10-CM

## 2024-04-29 DIAGNOSIS — I25.10 CORONARY ARTERY DISEASE INVOLVING NATIVE CORONARY ARTERY OF NATIVE HEART WITHOUT ANGINA PECTORIS: Primary | ICD-10-CM

## 2024-04-29 DIAGNOSIS — I50.22 CHRONIC SYSTOLIC CONGESTIVE HEART FAILURE (MULTI): Primary | ICD-10-CM

## 2024-04-29 DIAGNOSIS — R73.01 IMPAIRED FASTING GLUCOSE: ICD-10-CM

## 2024-04-29 DIAGNOSIS — E78.2 MIXED HYPERLIPIDEMIA: ICD-10-CM

## 2024-04-29 PROCEDURE — 1159F MED LIST DOCD IN RCRD: CPT | Performed by: INTERNAL MEDICINE

## 2024-04-29 PROCEDURE — 3008F BODY MASS INDEX DOCD: CPT | Performed by: INTERNAL MEDICINE

## 2024-04-29 PROCEDURE — 99214 OFFICE O/P EST MOD 30 MIN: CPT | Performed by: INTERNAL MEDICINE

## 2024-04-29 PROCEDURE — 3074F SYST BP LT 130 MM HG: CPT | Performed by: INTERNAL MEDICINE

## 2024-04-29 PROCEDURE — 1036F TOBACCO NON-USER: CPT | Performed by: INTERNAL MEDICINE

## 2024-04-29 PROCEDURE — 3078F DIAST BP <80 MM HG: CPT | Performed by: INTERNAL MEDICINE

## 2024-04-29 RX ORDER — FUROSEMIDE 40 MG/1
40 TABLET ORAL DAILY
Qty: 90 TABLET | Refills: 3 | Status: SHIPPED | OUTPATIENT
Start: 2024-04-29 | End: 2024-05-30 | Stop reason: SDUPTHER

## 2024-04-29 RX ORDER — CARVEDILOL 12.5 MG/1
12.5 TABLET ORAL 2 TIMES DAILY
Qty: 60 TABLET | Refills: 3 | Status: SHIPPED | OUTPATIENT
Start: 2024-04-29 | End: 2025-04-29

## 2024-04-29 RX ORDER — SPIRONOLACTONE 25 MG/1
25 TABLET ORAL DAILY
Qty: 90 TABLET | Refills: 3 | Status: SHIPPED | OUTPATIENT
Start: 2024-04-29 | End: 2024-05-30 | Stop reason: SDUPTHER

## 2024-04-29 RX ORDER — SACUBITRIL AND VALSARTAN 24; 26 MG/1; MG/1
1 TABLET, FILM COATED ORAL 2 TIMES DAILY
Qty: 60 TABLET | Refills: 11 | Status: SHIPPED | OUTPATIENT
Start: 2024-04-29 | End: 2025-04-29

## 2024-04-29 NOTE — PROGRESS NOTES
Name : Sandee York   : 1960   MRN : [id number]   ENC Date : 2024      Assessment and Plan:  Chronic systolic heart failure, near normal LVEF: NYHA class II with main symptom being fatigue.  Volume status looks excellent.  Blood pressure is acceptable.  Recommend no changes.  CAD: Mild plaque disease only.  Unclear why patient is not on a statin.  We will try to track this down.  If there is no reason we will add rosuvastatin.  Disp: RTO in 6 months    HPI:  Patient returns today feeling reasonably well.  Her main complaint is fatigue and tiredness.  She has poor sleep hygiene.  No lower extremity edema.  No orthopnea nor PND.  She states she has chronic chest pain that is been present for years and is unchanged.  No ICD shocks.    Problem list overview:   Patient Active Problem List   Diagnosis    CAD (coronary artery disease), native coronary artery    Cardiomegaly    Chronic systolic heart failure (Multi)    JENNY III (cervical intraepithelial neoplasia grade III) with severe dysplasia    CKD (chronic kidney disease) stage 3, GFR 30-59 ml/min (Multi)    COPD (chronic obstructive pulmonary disease) (Multi)    History of myocardial infarction    HTN (hypertension)    Combined hyperlipidemia    Hyperlipidemia    Impaired fasting glucose    CHF (congestive heart failure) (Multi)    LBBB (left bundle branch block)    Lung nodule    Mild diastolic dysfunction    Obesity, Class II, BMI 35-39.9    Other cardiomyopathies (Multi)    Otitis externa    Pacemaker    Presence of combination internal cardiac defibrillator (ICD) and pacemaker    Pre-diabetes    Tinea    Onychomycosis    Tinea manuum    Urge incontinence    Swelling    Water retention    Biventricular ICD (implantable cardioverter-defibrillator) in place    Former smoker    Nonischemic cardiomyopathy (Multi)       Meds:   Current Outpatient Medications on File Prior to Visit   Medication Sig Dispense Refill    aspirin 81 mg EC tablet Take 1  "tablet (81 mg) by mouth once daily.      EPINEPHrine 0.3 mg/0.3 mL injection syringe Inject 0.3 mL (0.3 mg) as directed if needed.      Farxiga 10 mg Take 1 tablet (10 mg) by mouth once daily. 30 tablet 11    fluconazole (Diflucan) 150 mg tablet Take 1 tablet (150 mg) by mouth once daily at bedtime for 14 days. 14 tablet 0    [DISCONTINUED] carvedilol (Coreg) 12.5 mg tablet Take 1 tablet (12.5 mg) by mouth 2 times a day. 60 tablet 3    [DISCONTINUED] Entresto 24-26 mg tablet Take 1 tablet by mouth 2 times a day.      [DISCONTINUED] furosemide (Lasix) 40 mg tablet Take 1 tablet (40 mg) by mouth once daily.      [DISCONTINUED] spironolactone (Aldactone) 25 mg tablet Take 1 tablet (25 mg) by mouth once daily.      [DISCONTINUED] Farxiga 10 mg TAKE 1 TABLET BY MOUTH EVERY  tablet 0     No current facility-administered medications on file prior to visit.        VS:  /68 (BP Location: Left arm, Patient Position: Sitting)   Pulse 81   Ht 1.499 m (4' 11\")   Wt 78 kg (172 lb)   SpO2 97%   BMI 34.74 kg/m²     Vitals reviewed.   Neck:      Vascular: No JVD.   Pulmonary:      Effort: Pulmonary effort is normal.      Breath sounds: Normal breath sounds.   Cardiovascular:      Normal rate. Regular rhythm.      Murmurs: There is no murmur.      No gallop.    Pulses:     Intact distal pulses.   Edema:     Peripheral edema absent.   Abdominal:      General: Abdomen is flat.      Palpations: Abdomen is soft.   Neurological:      General: No focal deficit present.      Mental Status: Alert.   Psychiatric:         Mood and Affect: Mood normal.         ECG: No results found for this or any previous visit.   ECHO:         Gurpreet Ray MD  "

## 2024-04-29 NOTE — TELEPHONE ENCOUNTER
Please call patient and ask her 2 things 1.  She is not on a statin and she has mild plaque disease of the coronary arteries.  She should be on either atorvastatin or rosuvastatin.    Also last blood work was from March 2023.  She should have blood work at least annually.  Can you see if she has not done outside of  or if this needs to be ordered.    SDH

## 2024-04-29 NOTE — TELEPHONE ENCOUNTER
Okay lets get labs checked.  Assuming her lipids will be abnormal we will likely try her on rosuvastatin which is better tolerated than atorvastatin.    We can make that decision once we get her laboratories back.    SDH

## 2024-05-01 ENCOUNTER — HOSPITAL ENCOUNTER (OUTPATIENT)
Dept: CARDIOLOGY | Facility: HOSPITAL | Age: 64
Discharge: HOME | End: 2024-05-01
Payer: MEDICARE

## 2024-05-01 DIAGNOSIS — Z95.810 ICD (IMPLANTABLE CARDIOVERTER-DEFIBRILLATOR) IN PLACE: ICD-10-CM

## 2024-05-01 DIAGNOSIS — I42.0 PRIMARY IDIOPATHIC DILATED CARDIOMYOPATHY (MULTI): ICD-10-CM

## 2024-05-01 PROCEDURE — 93295 DEV INTERROG REMOTE 1/2/MLT: CPT | Performed by: INTERNAL MEDICINE

## 2024-05-01 PROCEDURE — 93296 REM INTERROG EVL PM/IDS: CPT

## 2024-05-30 ENCOUNTER — TELEPHONE (OUTPATIENT)
Dept: CARDIOLOGY | Facility: CLINIC | Age: 64
End: 2024-05-30
Payer: MEDICARE

## 2024-05-30 DIAGNOSIS — E78.2 MIXED HYPERLIPIDEMIA: Primary | ICD-10-CM

## 2024-05-30 DIAGNOSIS — I50.22 CHRONIC SYSTOLIC HEART FAILURE (MULTI): ICD-10-CM

## 2024-05-30 DIAGNOSIS — I50.22 CHRONIC SYSTOLIC CONGESTIVE HEART FAILURE (MULTI): ICD-10-CM

## 2024-05-30 RX ORDER — FUROSEMIDE 40 MG/1
40 TABLET ORAL DAILY
Qty: 90 TABLET | Refills: 3 | Status: SHIPPED | OUTPATIENT
Start: 2024-05-30 | End: 2024-06-03 | Stop reason: SDUPTHER

## 2024-05-30 RX ORDER — SPIRONOLACTONE 25 MG/1
25 TABLET ORAL DAILY
Qty: 90 TABLET | Refills: 3 | Status: SHIPPED | OUTPATIENT
Start: 2024-05-30 | End: 2024-06-03 | Stop reason: SDUPTHER

## 2024-05-30 RX ORDER — DAPAGLIFLOZIN 10 MG/1
10 TABLET, FILM COATED ORAL DAILY
Qty: 30 TABLET | Refills: 11 | Status: SHIPPED | OUTPATIENT
Start: 2024-05-30 | End: 2025-05-30

## 2024-05-30 NOTE — TELEPHONE ENCOUNTER
Pt requesting refill for   furosemide (Lasix) 40 mg tablet Take 1 tablet (40 mg) by mouth once daily. And also   spironolactone (Aldactone) 25 mg tablet Take 1 tablet (25 mg) by mouth once daily.  The pharmacy says the refill she has is .   [facility] [place] [facility] [address].

## 2024-06-03 ENCOUNTER — LAB (OUTPATIENT)
Dept: LAB | Facility: LAB | Age: 64
End: 2024-06-03
Payer: MEDICARE

## 2024-06-03 DIAGNOSIS — R73.01 IMPAIRED FASTING GLUCOSE: ICD-10-CM

## 2024-06-03 DIAGNOSIS — E78.2 MIXED HYPERLIPIDEMIA: ICD-10-CM

## 2024-06-03 DIAGNOSIS — I25.10 CORONARY ARTERY DISEASE INVOLVING NATIVE CORONARY ARTERY OF NATIVE HEART WITHOUT ANGINA PECTORIS: ICD-10-CM

## 2024-06-03 LAB
ALBUMIN SERPL BCP-MCNC: 4.5 G/DL (ref 3.4–5)
ALP SERPL-CCNC: 101 U/L (ref 33–136)
ALT SERPL W P-5'-P-CCNC: 12 U/L (ref 7–45)
ANION GAP SERPL CALC-SCNC: 12 MMOL/L (ref 10–20)
AST SERPL W P-5'-P-CCNC: 14 U/L (ref 9–39)
BILIRUB SERPL-MCNC: 0.7 MG/DL (ref 0–1.2)
BUN SERPL-MCNC: 21 MG/DL (ref 6–23)
CALCIUM SERPL-MCNC: 9.4 MG/DL (ref 8.6–10.3)
CHLORIDE SERPL-SCNC: 105 MMOL/L (ref 98–107)
CHOLEST SERPL-MCNC: 213 MG/DL (ref 0–199)
CHOLESTEROL/HDL RATIO: 6
CO2 SERPL-SCNC: 25 MMOL/L (ref 21–32)
CREAT SERPL-MCNC: 0.92 MG/DL (ref 0.5–1.05)
EGFRCR SERPLBLD CKD-EPI 2021: 69 ML/MIN/1.73M*2
EST. AVERAGE GLUCOSE BLD GHB EST-MCNC: 137 MG/DL
GLUCOSE SERPL-MCNC: 95 MG/DL (ref 74–99)
HBA1C MFR BLD: 6.4 %
HDLC SERPL-MCNC: 35.7 MG/DL
LDLC SERPL CALC-MCNC: 138 MG/DL
NON HDL CHOLESTEROL: 177 MG/DL (ref 0–149)
POTASSIUM SERPL-SCNC: 4.4 MMOL/L (ref 3.5–5.3)
PROT SERPL-MCNC: 6.7 G/DL (ref 6.4–8.2)
SODIUM SERPL-SCNC: 138 MMOL/L (ref 136–145)
TRIGL SERPL-MCNC: 198 MG/DL (ref 0–149)
VLDL: 40 MG/DL (ref 0–40)

## 2024-06-03 PROCEDURE — 36415 COLL VENOUS BLD VENIPUNCTURE: CPT

## 2024-06-03 PROCEDURE — 83036 HEMOGLOBIN GLYCOSYLATED A1C: CPT

## 2024-06-03 PROCEDURE — 80053 COMPREHEN METABOLIC PANEL: CPT

## 2024-06-03 PROCEDURE — 80061 LIPID PANEL: CPT

## 2024-06-03 RX ORDER — ROSUVASTATIN CALCIUM 20 MG/1
20 TABLET, COATED ORAL DAILY
Qty: 30 TABLET | Refills: 11 | Status: SHIPPED | OUTPATIENT
Start: 2024-06-03 | End: 2025-06-03

## 2024-06-03 RX ORDER — SPIRONOLACTONE 25 MG/1
25 TABLET ORAL DAILY
Qty: 90 TABLET | Refills: 3 | Status: SHIPPED | OUTPATIENT
Start: 2024-06-03 | End: 2025-06-03

## 2024-06-03 RX ORDER — FUROSEMIDE 40 MG/1
40 TABLET ORAL DAILY
Qty: 90 TABLET | Refills: 3 | Status: SHIPPED | OUTPATIENT
Start: 2024-06-03 | End: 2025-06-03

## 2024-06-03 NOTE — TELEPHONE ENCOUNTER
Please let patient know her lipids came back abnormal.  I would recommend we try rosuvastatin 20 mg daily.  She can take this in the morning.  I refilled her furosemide and spironolactone prescriptions.    SDH

## 2024-06-04 NOTE — TELEPHONE ENCOUNTER
I spoke with patient she would like to try diet modification and reassess lipids in November when she see you. She is to concerned about her sugar levels.

## 2024-08-01 DIAGNOSIS — I10 ESSENTIAL HYPERTENSION, BENIGN: ICD-10-CM

## 2024-08-01 RX ORDER — CARVEDILOL 12.5 MG/1
12.5 TABLET ORAL 2 TIMES DAILY
Qty: 60 TABLET | Refills: 3 | Status: SHIPPED | OUTPATIENT
Start: 2024-08-01 | End: 2025-08-01

## 2024-08-06 ENCOUNTER — HOSPITAL ENCOUNTER (OUTPATIENT)
Dept: CARDIOLOGY | Facility: HOSPITAL | Age: 64
Discharge: HOME | End: 2024-08-06
Payer: MEDICARE

## 2024-08-06 DIAGNOSIS — I42.0 PRIMARY IDIOPATHIC DILATED CARDIOMYOPATHY (MULTI): ICD-10-CM

## 2024-08-06 DIAGNOSIS — Z95.810 ICD (IMPLANTABLE CARDIOVERTER-DEFIBRILLATOR) IN PLACE: ICD-10-CM

## 2024-08-06 PROCEDURE — 93296 REM INTERROG EVL PM/IDS: CPT

## 2024-08-06 PROCEDURE — 93295 DEV INTERROG REMOTE 1/2/MLT: CPT | Performed by: INTERNAL MEDICINE

## 2024-08-27 ENCOUNTER — OFFICE VISIT (OUTPATIENT)
Dept: PRIMARY CARE | Facility: CLINIC | Age: 64
End: 2024-08-27
Payer: MEDICARE

## 2024-08-27 VITALS
DIASTOLIC BLOOD PRESSURE: 62 MMHG | HEIGHT: 58 IN | HEART RATE: 84 BPM | BODY MASS INDEX: 35.89 KG/M2 | OXYGEN SATURATION: 94 % | TEMPERATURE: 97 F | WEIGHT: 171 LBS | RESPIRATION RATE: 16 BRPM | SYSTOLIC BLOOD PRESSURE: 95 MMHG

## 2024-08-27 DIAGNOSIS — H60.502 ACUTE OTITIS EXTERNA OF LEFT EAR, UNSPECIFIED TYPE: Primary | ICD-10-CM

## 2024-08-27 DIAGNOSIS — E66.01 CLASS 2 SEVERE OBESITY WITH SERIOUS COMORBIDITY AND BODY MASS INDEX (BMI) OF 35.0 TO 35.9 IN ADULT, UNSPECIFIED OBESITY TYPE (MULTI): ICD-10-CM

## 2024-08-27 DIAGNOSIS — H92.02 OTALGIA OF LEFT EAR: ICD-10-CM

## 2024-08-27 DIAGNOSIS — S00.412A ABRASION OF LEFT EAR CANAL, INITIAL ENCOUNTER: ICD-10-CM

## 2024-08-27 DIAGNOSIS — H66.90 EAR INFECTION: ICD-10-CM

## 2024-08-27 PROCEDURE — 99213 OFFICE O/P EST LOW 20 MIN: CPT | Performed by: NURSE PRACTITIONER

## 2024-08-27 RX ORDER — NEOMYCIN SULFATE, POLYMYXIN B SULFATE, HYDROCORTISONE 3.5; 10000; 1 MG/ML; [USP'U]/ML; MG/ML
2 SOLUTION/ DROPS AURICULAR (OTIC) 4 TIMES DAILY
Qty: 2.8 ML | Refills: 0 | Status: SHIPPED | OUTPATIENT
Start: 2024-08-27 | End: 2024-09-03

## 2024-08-27 ASSESSMENT — PATIENT HEALTH QUESTIONNAIRE - PHQ9
1. LITTLE INTEREST OR PLEASURE IN DOING THINGS: NOT AT ALL
2. FEELING DOWN, DEPRESSED OR HOPELESS: NOT AT ALL
SUM OF ALL RESPONSES TO PHQ9 QUESTIONS 1 AND 2: 0
SUM OF ALL RESPONSES TO PHQ9 QUESTIONS 1 AND 2: 0
2. FEELING DOWN, DEPRESSED OR HOPELESS: NOT AT ALL
1. LITTLE INTEREST OR PLEASURE IN DOING THINGS: NOT AT ALL

## 2024-08-27 ASSESSMENT — ENCOUNTER SYMPTOMS
DEPRESSION: 0
LOSS OF SENSATION IN FEET: 0
OCCASIONAL FEELINGS OF UNSTEADINESS: 0

## 2024-08-27 NOTE — PROGRESS NOTES
Subjective   Patient ID: Sandee York is a 64 y.o. female who is with complaint of left ear pain and tenderness.    HPI  Patient is a 64 y.o. female who CONSULTED AT [facility] today. Patient is with complaints of pain and tenderness on the left ear. Patient states condition started about 10 days ago. Pain is achy, progressively worsening and sometimes accompanied by muffled sound, and ringing. This was accompanied by pain tenderness and pain when pressing on the earlobe. She denies ear discharge, bleeding from ears, nor vertigo. She denies trauma nor foreign body but admitted to sometimes absentmindedly using her little finger to scratch the inside of her ear. She states that she is taking OTC pain medication for relief of symptoms. She denies fever, chills, nasal congestion, nasal discharge, shortness of breath, nor chest pain.    Review of Systems  General: no weight loss, generally healthy, no fatigue  Head:  no headaches / sinus pain, no vertigo, no injury  Eyes: no diplopia, no tearing, no pain,   Ears: (+) pain and tenderness on the left ear, (+) intermittent muffled sound left ear, (+) tinnitus, no bleeding, no vertigo  Mouth:  no dental difficulties, no gingival bleeding, no sore throat, no loss of sense of taste  Nose: no congestion, no  discharge, no bleeding, no obstruction, no loss of sense of smell  Neck: no stiffness, no pain, no tenderness, no masses, no bruit  Pulmonary: no dyspnea, no wheezing, no hemoptysis, no cough  Cardiovascular: no chest pain, no palpitations, no syncope, no orthopnea  Gastrointestinal: no change in appetite, no dysphagia, no abdominal pains, no diarrhea, no emesis, no melena  Genito Urinary: no dysuria, no urinary urgency, no nocturia, no incontinence, no change in nature of urine  Musculoskeletal: no muscle ache, no joint pain, no limitation of range of motion, no paresthesia, no numbness  Constitutional: no fever, no chills, no night  sweats    Objective   Physical Exam  General: ambulatory, in no acute distress  Head: normocephalic, no lesions  Eyes: pink palpebral conjunctiva, anicteric sclerae,  Ears: LEFT EAR: EAC is with (+) abrasion on the posterior aspect (3 o clock position), (+) erythema, (+) swelling / congestion, no discharge, no bleeding; TM intact/ not bulging/ no fluid level; (+) tragal tenderness;;; RIGHT EAR: clear external auditory canals, no ear discharge, no bleeding from the ear, tympanic membrane intact  Nose: nasal mucosa normal, no nasal discharge, no bleeding, no obstruction  Throat: clear, no exudate, no lesions  Neck: supple, no masses, no bruits  Chest: symmetrical chest expansion, no lagging, no retractions, clear breath sounds, no rales, no wheezes    Assessment/Plan   Problem List Items Addressed This Visit             ICD-10-CM    Otitis externa - Primary H60.90    Relevant Medications    neomycin-polymyxin-HC (Cortisporin) otic solution     Other Visit Diagnoses         Codes    Abrasion of left ear canal, initial encounter     S00.412A    Relevant Medications    neomycin-polymyxin-HC (Cortisporin) otic solution    Ear infection     H66.90    Relevant Medications    neomycin-polymyxin-HC (Cortisporin) otic solution    Otalgia of left ear     H92.02    Relevant Medications    neomycin-polymyxin-HC (Cortisporin) otic solution    BMI 35.0-35.9,adult     Z68.35    Class 2 severe obesity with serious comorbidity and body mass index (BMI) of 35.0 to 35.9 in adult, unspecified obesity type (Multi)     E66.01, Z68.35        DISCHARGE SUMMARY:   Patient was seen and examined. Diagnosis, treatment, treatment options, and possible complications of today's illness discussed and explained to patient. Patient to take medication/s associated with this visit. Patient may also take OTC analgesic/antipyretic if needed for pain/fever. Advised to avoid ear manipulation / cleaning. Advised to avoid submerging on water. Advised to  increase oral fluid intake. Advised to come back if with worsening or persistent symptoms. Patient verbalized understanding of plan of care.    Patient to come back in 7 - 10 days if needed for worsening symptoms.           NBA Newby-CNP 08/27/24 1:03 PM

## 2024-08-27 NOTE — PROGRESS NOTES
"Subjective   Patient ID: Sandee York is a 64 y.o. female who presents for Earache.    Earache   There is pain in the left ear. Episode onset: two weeks. The pain is moderate. Associated symptoms comments: Ear popping.        Review of Systems   HENT:  Positive for ear pain.        Objective   BP 95/62 (BP Location: Left arm, Patient Position: Sitting, BP Cuff Size: Adult)   Pulse 84   Temp 36.1 °C (97 °F) (Temporal)   Resp 16   Ht 1.473 m (4' 10\")   Wt 77.6 kg (171 lb)   SpO2 94%   BMI 35.74 kg/m²     Physical Exam    Assessment/Plan          "

## 2024-08-27 NOTE — PATIENT INSTRUCTIONS
DISCHARGE SUMMARY:   Patient was seen and examined. Diagnosis, treatment, treatment options, and possible complications of today's illness discussed and explained to patient. Patient to take medication/s associated with this visit. Patient may also take OTC analgesic/antipyretic if needed for pain/fever. Advised to avoid ear manipulation / cleaning. Advised to avoid submerging on water. Advised to increase oral fluid intake. Advised to come back if with worsening or persistent symptoms. Patient verbalized understanding of plan of care.    Patient to come back in 7 - 10 days if needed for worsening symptoms.

## 2024-11-04 ENCOUNTER — APPOINTMENT (OUTPATIENT)
Dept: CARDIOLOGY | Facility: CLINIC | Age: 64
End: 2024-11-04
Payer: MEDICARE

## 2024-11-04 VITALS
WEIGHT: 169 LBS | HEIGHT: 59 IN | OXYGEN SATURATION: 72 % | HEART RATE: 96 BPM | SYSTOLIC BLOOD PRESSURE: 124 MMHG | BODY MASS INDEX: 34.07 KG/M2 | DIASTOLIC BLOOD PRESSURE: 68 MMHG

## 2024-11-04 DIAGNOSIS — I50.22 CHRONIC SYSTOLIC HEART FAILURE: ICD-10-CM

## 2024-11-04 DIAGNOSIS — I50.22 CHRONIC SYSTOLIC CONGESTIVE HEART FAILURE: ICD-10-CM

## 2024-11-04 DIAGNOSIS — I10 ESSENTIAL HYPERTENSION, BENIGN: ICD-10-CM

## 2024-11-04 PROCEDURE — 99214 OFFICE O/P EST MOD 30 MIN: CPT | Performed by: INTERNAL MEDICINE

## 2024-11-04 PROCEDURE — 3078F DIAST BP <80 MM HG: CPT | Performed by: INTERNAL MEDICINE

## 2024-11-04 PROCEDURE — 3074F SYST BP LT 130 MM HG: CPT | Performed by: INTERNAL MEDICINE

## 2024-11-04 PROCEDURE — 3008F BODY MASS INDEX DOCD: CPT | Performed by: INTERNAL MEDICINE

## 2024-11-04 PROCEDURE — 1159F MED LIST DOCD IN RCRD: CPT | Performed by: INTERNAL MEDICINE

## 2024-11-04 PROCEDURE — 1036F TOBACCO NON-USER: CPT | Performed by: INTERNAL MEDICINE

## 2024-11-04 RX ORDER — DAPAGLIFLOZIN 10 MG/1
10 TABLET, FILM COATED ORAL DAILY
Qty: 30 TABLET | Refills: 11 | Status: SHIPPED | OUTPATIENT
Start: 2024-11-04 | End: 2025-11-04

## 2024-11-04 RX ORDER — FUROSEMIDE 40 MG/1
40 TABLET ORAL DAILY
Qty: 90 TABLET | Refills: 3 | Status: SHIPPED | OUTPATIENT
Start: 2024-11-04 | End: 2025-11-04

## 2024-11-04 RX ORDER — CARVEDILOL 12.5 MG/1
12.5 TABLET ORAL 2 TIMES DAILY
Qty: 180 TABLET | Refills: 3 | Status: SHIPPED | OUTPATIENT
Start: 2024-11-04 | End: 2025-11-04

## 2024-11-04 RX ORDER — SPIRONOLACTONE 25 MG/1
25 TABLET ORAL DAILY
Qty: 90 TABLET | Refills: 3 | Status: SHIPPED | OUTPATIENT
Start: 2024-11-04 | End: 2025-11-04

## 2024-11-04 RX ORDER — SACUBITRIL AND VALSARTAN 24; 26 MG/1; MG/1
1 TABLET, FILM COATED ORAL 2 TIMES DAILY
Qty: 60 TABLET | Refills: 11 | Status: SHIPPED | OUTPATIENT
Start: 2024-11-04 | End: 2025-11-04

## 2024-11-04 NOTE — PROGRESS NOTES
Name : Sandee York   : 1960   MRN : [id number]   ENC Date : 2024      Assessment and Plan:  Chronic systolic heart failure, normalized LVEF: Patient is NYHA class II with symptoms of fatigue and some dyspnea.  Otherwise doing quite well.  Tolerating medications well.  Recommend no medication changes.  Coronary artery disease: No anginal symptoms.  She has some atypical chest pain but this does not sound cardiac in nature.  Dyslipidemia: Patient did not tolerate statin due to blood sugar elevation.  She does not want to try another statin.  Could consider Nexletol or PCSK9 inhibitor in the future if needed but patient would prefer not to add any medications at this time.  Disp: RTO in 1 year    HPI:  Patient returns today doing reasonably well.  No syncopal events.  She is tolerating her medications well.  She has had a couple of low blood pressures documented but was not symptomatic with them.  No orthopnea nor PND.  Still has some sharp chest pain that can happen almost every day but nothing that last for more than a few seconds.  No lower extremity edema.    Problem list overview:   Patient Active Problem List   Diagnosis    CAD (coronary artery disease), native coronary artery    Cardiomegaly    Chronic systolic heart failure    JENNY III (cervical intraepithelial neoplasia grade III) with severe dysplasia    CKD (chronic kidney disease) stage 3, GFR 30-59 ml/min (Multi)    COPD (chronic obstructive pulmonary disease) (Multi)    History of myocardial infarction    HTN (hypertension)    Combined hyperlipidemia    Hyperlipidemia    Impaired fasting glucose    CHF (congestive heart failure)    LBBB (left bundle branch block)    Lung nodule    Mild diastolic dysfunction    Obesity, Class II, BMI 35-39.9    Other cardiomyopathies    Otitis externa    Pacemaker    Presence of combination internal cardiac defibrillator (ICD) and pacemaker    Pre-diabetes    Tinea    Onychomycosis    Tinea manuum     "Urge incontinence    Swelling    Water retention    Biventricular ICD (implantable cardioverter-defibrillator) in place    Former smoker    Nonischemic cardiomyopathy (Multi)       Meds:   Current Outpatient Medications on File Prior to Visit   Medication Sig Dispense Refill    aspirin 81 mg EC tablet Take 1 tablet (81 mg) by mouth once daily.      carvedilol (Coreg) 12.5 mg tablet Take 1 tablet (12.5 mg) by mouth 2 times a day. 60 tablet 3    Entresto 24-26 mg tablet Take 1 tablet by mouth 2 times a day. 60 tablet 11    EPINEPHrine 0.3 mg/0.3 mL injection syringe Inject 0.3 mL (0.3 mg) as directed if needed.      Farxiga 10 mg Take 1 tablet (10 mg) by mouth once daily. 30 tablet 11    furosemide (Lasix) 40 mg tablet Take 1 tablet (40 mg) by mouth once daily. 90 tablet 3    spironolactone (Aldactone) 25 mg tablet Take 1 tablet (25 mg) by mouth once daily. 90 tablet 3    rosuvastatin (Crestor) 20 mg tablet Take 1 tablet (20 mg) by mouth once daily. (Patient not taking: Reported on 11/4/2024) 30 tablet 11     No current facility-administered medications on file prior to visit.        VS:  /68 (BP Location: Left arm, Patient Position: Sitting)   Pulse 96   Ht 1.499 m (4' 11\")   Wt 76.7 kg (169 lb)   SpO2 (!) 72%   BMI 34.13 kg/m²     Vitals reviewed.   Neck:      Vascular: No JVD.   Pulmonary:      Effort: Pulmonary effort is normal.      Breath sounds: Normal breath sounds.   Cardiovascular:      Normal rate. Regular rhythm.      Murmurs: There is no murmur.      No gallop.    Pulses:     Intact distal pulses.   Edema:     Peripheral edema absent.   Abdominal:      General: Abdomen is flat.      Palpations: Abdomen is soft.   Neurological:      General: No focal deficit present.      Mental Status: Alert.   Psychiatric:         Mood and Affect: Mood normal.         ECG: No results found for this or any previous visit.   ECHO: Results for orders placed in visit on " 04/18/23    Echocardiogram    Narrative  [facility], [address] [place] [address], [place] [address]  Tel [phone] and Fax [phone]    TRANSTHORACIC ECHOCARDIOGRAM REPORT      Patient Name:     SANDEE MEJIAS        Reading Physician:  [id number] Cas Alcala MD  SURFACE  Study Date:       4/18/2023            Referring           MADDISONCITLALLI HARRIS  Physician:  MRN/PID:          [id number]             PCP:  Accession/Order#: [id number]         Department          [facility]  Location:  YOB: 1960             Fellow:  Gender:           F                    Nurse:  Admit Date:                            Sonographer:        Sandee Blanchard [facility]  Admission Status: Outpatient           Additional Staff:  Height:           149.86 cm            CC Report to:  Weight:           83.01 kg             Study Type:         Echocardiogram  BSA:              1.78 m2  Blood Pressure: 132 /79 mmHg    Diagnosis/ICD: I42.8-Other cardiomyopathies  Indication:    NICM  Procedure/CPT: Echo Complete w Full [id number]    Patient History:  Pertinent History: Obesity, HLD, MI, BiVICD, CHF, NICM, Chronic systolic heart  failure, LBBB.    Study Detail: The following Echo studies were performed: 2D, M-Mode, Doppler and  color flow. Technically challenging study due to prominent lung  artifact and body habitus.      PHYSICIAN INTERPRETATION:  Left Ventricle: The left ventricular systolic function is mildly decreased, with an estimated ejection fraction of 50%. The left ventricular cavity size is normal. Spectral Doppler shows an impaired relaxation pattern of left ventricular diastolic filling.  Left Atrium: The left atrium is normal in size.  Right Ventricle: The right ventricle is normal in size. There is low normal right ventricular systolic function.  Right Atrium: The right atrium is normal in size.  Aortic Valve: The aortic valve is probably trileaflet. There is minimal aortic valve cusp calcification. There is trivial  aortic valve regurgitation. The peak instantaneous gradient of the aortic valve is 4.0 mmHg.  Mitral Valve: The mitral valve is normal in structure. There is mild mitral annular calcification. There is trace mitral valve regurgitation.  Tricuspid Valve: The tricuspid valve is structurally normal. There is trace tricuspid regurgitation.  Pulmonic Valve: The pulmonic valve is structurally normal. There is trace to mild pulmonic valve regurgitation.  Pericardium: There is a trivial pericardial effusion.  Aorta: The aortic root is abnormal. There is upper limits of normal dilatation of the ascending aorta. There is mild dilatation of the aortic root.  Pulmonary Artery: The tricuspid regurgitant velocity is 2.13 m/s, and with an estimated right atrial pressure of 8 mmHg, the estimated pulmonary artery pressure is normal with the RVSP at 26.2 mmHg.  Systemic Veins: The inferior vena cava appears mildly dilated.      CONCLUSIONS:  1. Poorly visualized anatomical structures due to suboptimal image quality.  2. Left ventricular systolic function is mildly decreased with a 50% estimated ejection fraction.  3. Spectral Doppler shows an impaired relaxation pattern of left ventricular diastolic filling.  4. There is low normal right ventricular systolic function.    QUANTITATIVE DATA SUMMARY:  2D MEASUREMENTS:  Normal Ranges:  Ao Root d:     2.20 cm   (2.0-3.7cm)  LAs:           2.95 cm   (2.7-4.0cm)  IVSd:          0.70 cm   (0.6-1.1cm)  LVPWd:         1.09 cm   (0.6-1.1cm)  LVIDd:         4.44 cm   (3.9-5.9cm)  LVIDs:         3.39 cm  LV Mass Index: 72.5 g/m2  LV % FS        23.7 %    LA VOLUME:  Normal Ranges:  LA Vol A4C:        37.7 ml    (22+/-6mL/m2)  LA Vol A2C:        45.5 ml  LA Vol BP:         42.4 ml  LA Vol Index A4C:  21.3 ml/m2  LA Vol Index A2C:  25.7 ml/m2  LA Vol Index BP:   23.9 ml/m2  LA Area A4C:       14.6 cm2  LA Area A2C:       15.7 cm2  LA Major Axis A4C: 4.8 cm  LA Major Axis A2C: 4.6 cm  LA Volume  Index:   23.9 ml/m2  LA Vol A4C:        35.5 ml  LA Vol A2C:        42.9 ml    RA VOLUME BY A/L METHOD:  Normal Ranges:  RA Vol A4C:        31.9 ml    (8.3-19.5ml)  RA Vol Index A4C:  18.0 ml/m2  RA Area A4C:       12.7 cm2  RA Major Axis A4C: 4.3 cm    AORTA MEASUREMENTS:  Normal Ranges:  Ao Sinus, d: 3.90 cm (2.1-3.5cm)  Asc Ao, d:   3.50 cm (2.1-3.4cm)  Ao Arch:     2.90 cm (2.0-3.6cm)    LV SYSTOLIC FUNCTION BY 2D PLANIMETRY (MOD):  Normal Ranges:  EF-A4C View: 43.1 % (>=55%)  EF-A2C View: 49.4 %  EF-Biplane:  47.0 %    LV DIASTOLIC FUNCTION:  Normal Ranges:  MV Peak E:        0.36 m/s    (0.7-1.2 m/s)  MV Peak A:        0.74 m/s    (0.42-0.7 m/s)  E/A Ratio:        0.48        (1.0-2.2)  MV e'             0.06 m/s    (>8.0)  MV A Dur:         143.02 msec  E/e' Ratio:       6.48        (<8.0)  MV DT:            106 msec    (150-240 msec)  PulmV Sys Caio:    60.24 cm/s  PulmV Martinez Caio:   38.89 cm/s  PulmV S/D Caio:    1.55  PulmV A Revs Caio: 37.79 cm/s  PulmV A Revs Dur: 119.95 msec    MITRAL VALVE:  Normal Ranges:  MV DT: 106 msec (150-240msec)    AORTIC VALVE:  Normal Ranges:  AoV Vmax:      1.00 m/s (<=1.7m/s)  AoV Peak P.0 mmHg (<20mmHg)  LVOT Max Caio:  0.92 m/s (<=1.1m/s)  LVOT VTI:      15.41 cm  LVOT Diameter: 2.22 cm  (1.8-2.4cm)  AoV Area,Vmax: 3.55 cm2 (2.5-4.5cm2)    RIGHT VENTRICLE:  RV 1   3.7 cm  RV 2   2.7 cm  RV 3   6.7 cm  TAPSE: 13.0 mm  RV s'  0.08 m/s    TRICUSPID VALVE/RVSP:  Normal Ranges:  Peak TR Velocity: 2.13 m/s  RV Syst Pressure: 26.2 mmHg (< 30mmHg)  IVC Diam:         2.30 cm    PULMONIC VALVE:  Normal Ranges:  PV Accel Time: 80 msec  (>120ms)  PV Max Caio:    0.6 m/s  (0.6-0.9m/s)  PV Max P.5 mmHg    Pulmonary Veins:  PulmV A Revs Dur: 119.95 msec  PulmV A Revs Caio: 37.79 cm/s  PulmV Martinez Caio:   38.89 cm/s  PulmV S/D Caio:    1.55  PulmV Sys Caio:    60.24 cm/s    AORTA:  Asc Ao Diam 3.53 cm      [id number] Cas Alcala MD  Electronically signed on 2023 at 3:45:12  PM      Gurpreet Ray MD

## 2024-11-12 ENCOUNTER — HOSPITAL ENCOUNTER (OUTPATIENT)
Dept: CARDIOLOGY | Facility: HOSPITAL | Age: 64
Discharge: HOME | End: 2024-11-12
Payer: MEDICARE

## 2024-11-12 DIAGNOSIS — Z95.810 ICD (IMPLANTABLE CARDIOVERTER-DEFIBRILLATOR) IN PLACE: Primary | ICD-10-CM

## 2024-11-12 DIAGNOSIS — Z95.810 ICD (IMPLANTABLE CARDIOVERTER-DEFIBRILLATOR) IN PLACE: ICD-10-CM

## 2024-11-12 DIAGNOSIS — I50.22 CHRONIC SYSTOLIC HEART FAILURE: ICD-10-CM

## 2024-11-12 PROCEDURE — 93296 REM INTERROG EVL PM/IDS: CPT

## 2024-11-12 PROCEDURE — 93295 DEV INTERROG REMOTE 1/2/MLT: CPT | Performed by: INTERNAL MEDICINE

## 2025-01-28 ENCOUNTER — APPOINTMENT (OUTPATIENT)
Dept: CARDIOLOGY | Facility: CLINIC | Age: 65
End: 2025-01-28
Payer: MEDICARE

## 2025-01-29 ENCOUNTER — OFFICE VISIT (OUTPATIENT)
Dept: CARDIOLOGY | Facility: CLINIC | Age: 65
End: 2025-01-29
Payer: MEDICARE

## 2025-01-29 ENCOUNTER — HOSPITAL ENCOUNTER (OUTPATIENT)
Dept: CARDIOLOGY | Facility: HOSPITAL | Age: 65
Discharge: HOME | End: 2025-01-29
Payer: MEDICARE

## 2025-01-29 VITALS
HEIGHT: 59 IN | HEART RATE: 94 BPM | WEIGHT: 170 LBS | DIASTOLIC BLOOD PRESSURE: 75 MMHG | TEMPERATURE: 96 F | OXYGEN SATURATION: 94 % | SYSTOLIC BLOOD PRESSURE: 121 MMHG | RESPIRATION RATE: 16 BRPM | BODY MASS INDEX: 34.27 KG/M2

## 2025-01-29 DIAGNOSIS — Z95.0 PACEMAKER: Primary | ICD-10-CM

## 2025-01-29 DIAGNOSIS — Z95.810 BIVENTRICULAR ICD (IMPLANTABLE CARDIOVERTER-DEFIBRILLATOR) IN PLACE: ICD-10-CM

## 2025-01-29 PROCEDURE — 3078F DIAST BP <80 MM HG: CPT | Performed by: INTERNAL MEDICINE

## 2025-01-29 PROCEDURE — 3008F BODY MASS INDEX DOCD: CPT | Performed by: INTERNAL MEDICINE

## 2025-01-29 PROCEDURE — 1159F MED LIST DOCD IN RCRD: CPT | Performed by: INTERNAL MEDICINE

## 2025-01-29 PROCEDURE — 1036F TOBACCO NON-USER: CPT | Performed by: INTERNAL MEDICINE

## 2025-01-29 PROCEDURE — 3074F SYST BP LT 130 MM HG: CPT | Performed by: INTERNAL MEDICINE

## 2025-01-29 PROCEDURE — 93005 ELECTROCARDIOGRAM TRACING: CPT | Performed by: INTERNAL MEDICINE

## 2025-01-29 PROCEDURE — 93010 ELECTROCARDIOGRAM REPORT: CPT | Performed by: INTERNAL MEDICINE

## 2025-01-29 PROCEDURE — 99204 OFFICE O/P NEW MOD 45 MIN: CPT | Performed by: INTERNAL MEDICINE

## 2025-01-29 PROCEDURE — 99214 OFFICE O/P EST MOD 30 MIN: CPT | Mod: 25 | Performed by: INTERNAL MEDICINE

## 2025-01-29 PROCEDURE — G2211 COMPLEX E/M VISIT ADD ON: HCPCS | Performed by: INTERNAL MEDICINE

## 2025-01-29 PROCEDURE — 93284 PRGRMG EVAL IMPLANTABLE DFB: CPT

## 2025-01-29 NOTE — PROGRESS NOTES
Referring Provider: Travis Simmons MD  Reason for Consult: Nonischemic cardiomyopathy status post CRT-D    History of Present Illness:      Sandee York is a 65 y.o. year old female patient with a history significant for nonischemic cardiomyopathy status post CRT-D with excellent response, hyperlipidemia who is referred by Dr. Simmons for device management.    Patient has a prior history of nonischemic cardiomyopathy with initial LV ejection fraction of 25% incomplete left bundle branch block.  She underwent implantation of an Abbott biventricular ICD by Dr. Sachin Maxwell in September 2016.  After implantation of the CRT-D, she had improvement of her LV ejection fraction and heart failure symptoms.  Her most recent echocardiogram showed an ejection fraction of 50%.  She is otherwise on guideline directed medical therapy for her cardiomyopathy and follows with Dr. Gurpreet Ray.  She is here today for routine device management.    Focused Cardiovascular Problem List:  Nonischemic cardiomyopathy s/p CRTD in 2016 (Kee), with super-response: On GDMT, managed by Dr. Ray. Last LVEF 50%.   Hyperlipidemia        Past Medical and Surgical History:  Ms. York  has no past medical history on file.    has a past surgical history that includes Other surgical history (01/13/2020).    Social History:  Social History     Tobacco Use    Smoking status: Never    Smokeless tobacco: Never   Substance Use Topics    Alcohol use: Never      Tobacco: Denies  Alcohol: Denies  Drug use:  Denies        Relevant Family History:   Family History   Problem Relation Name Age of Onset    Coronary artery disease Father          Allergies:  Allergies   Allergen Reactions    Bee Venom Protein (Honey Bee) Unknown        Medications:  Current Outpatient Medications   Medication Instructions    aspirin 81 mg EC tablet 1 tablet, Daily    carvedilol (COREG) 12.5 mg, oral, 2 times daily    Entresto 24-26 mg tablet 1 tablet,  "oral, 2 times daily    EPINEPHrine 0.3 mg/0.3 mL injection syringe 1 Syringe, As needed    Farxiga 10 mg, oral, Daily    furosemide (LASIX) 40 mg, oral, Daily    spironolactone (ALDACTONE) 25 mg, oral, Daily         Objective   Physical Exam:  Last Recorded Vitals:      7/10/2023     4:16 PM 10/19/2023    10:08 AM 1/30/2024     1:01 PM 4/29/2024     9:55 AM 8/27/2024    12:59 PM 11/4/2024    11:33 AM 1/29/2025     9:27 AM   Vitals   Systolic 122 110 110 112 95 124 121   Diastolic 78 80 70 68 62 68 75   BP Location  Right arm Left arm Left arm Left arm Left arm    Heart Rate 97 75 72 81 84 72 94   Temp 36.5 °C (97.7 °F)  36.3 °C (97.3 °F)  36.1 °C (97 °F)  35.6 °C (96 °F)   Resp 16    16  16   Height 1.486 m (4' 10.5\") 1.499 m (4' 11\") 1.499 m (4' 11\") 1.499 m (4' 11\") 1.473 m (4' 10\") 1.499 m (4' 11\") 1.499 m (4' 11\")   Weight (lb) 176.4 168 170.8 172 171 169 170   BMI 36.24 kg/m2 33.93 kg/m2 34.5 kg/m2 34.74 kg/m2 35.74 kg/m2 34.13 kg/m2 34.34 kg/m2   BSA (m2) 1.82 m2 1.78 m2 1.8 m2 1.8 m2 1.78 m2 1.79 m2 1.79 m2   Visit Report Report Report Report Report Report Report Report    Visit Vitals  /75   Pulse 94   Temp 35.6 °C (96 °F)   Resp 16   Ht 1.499 m (4' 11\")   Wt 77.1 kg (170 lb)   SpO2 94%   BMI 34.34 kg/m²   OB Status Unknown   Smoking Status Never   BSA 1.79 m²      Gen: NAD, sitting comfortably  HEENT: NC/AT  Chest: Device in situ in left upper chest, no overlying erythema or tenderness  Card: RRR, no m/r/g  Pulm: Clear to auscultation bilaterally  Ext: No LE edema  Neuro: No focal deficits    Diagnostic Results      My Interpretation of Reviewed Study(s):  Prior ECGs (reviewed and my interpretation):   1/29/2025: Atrial sensed ventricular paced rhythm, heart rate 84 bpm    Echocardiography:  4/2023: LVEF 50%, otherwise normal echocardiogram with no significant valvular disease    Other Relevant Imaging:  Device interrogation 1/29/2025: Battery longevity 1 year 3 months.  11% right atrial pacing, 96% " effective CRT.  No AT/AF episodes.  No ventricular detections.  VF zone at 188 bpm.      Relevant Labs:  Lab Results   Component Value Date    CREATININE 0.92 06/03/2024    CREATININE 0.89 03/06/2023    CREATININE 0.92 09/14/2022    K 4.4 06/03/2024    K 4.1 03/06/2023    K 4.3 09/14/2022    HGBA1C 6.4 (H) 06/03/2024    HGBA1C 8.2 07/03/2021    HGB 13.0 07/03/2021    HGB 13.5 01/13/2020    AST 14 06/03/2024    AST 16 09/14/2022    AST 20 07/03/2021    ALT 12 06/03/2024    ALT 16 09/14/2022    ALT 22 07/03/2021       Assessment/Plan   Assessment and Plan:  Sandee York is a 65 y.o. year old female patient who is referred for management and evaluation of nonischemic cardiomyopathy status post CRT-D.  She is a super responder to cardiac resynchronization with near normalization of her LV ejection fraction after implantation of the biventricular defibrillator.  She has biventricularly paced 96% of the time.  She is otherwise doing well today with no complaints.  Her device interrogation shows a normally functioning device without any significant arrhythmias.  She has 1 year 3 months left on her battery.    She will follow-up in 1 year.  At that visit, we will schedule elective generator replacement.  She will continue to follow-up on a routine basis at the [place] device clinic.       Return to Clinic: Patient should return to the EP Clinic in 1 year     Thank you very much for allowing me to participate in the care of this patient. Please do not hesitate to contact me with any further questions or concerns.    Antwon Shah MD  Clinical Cardiac Electrophysiologist, [facility] [place]    of Medicine, [employer]  Director of Atrial Fibrillation Ablation, [facility]  Director of Ventricular Arrhythmias Research, [facility]  Office Phone Number: [phone]

## 2025-05-07 ENCOUNTER — HOSPITAL ENCOUNTER (OUTPATIENT)
Dept: CARDIOLOGY | Facility: HOSPITAL | Age: 65
Discharge: HOME | End: 2025-05-07
Payer: MEDICARE

## 2025-05-07 DIAGNOSIS — I42.9 CARDIOMYOPATHY, UNSPECIFIED TYPE (MULTI): ICD-10-CM

## 2025-05-07 DIAGNOSIS — Z95.810 PRESENCE OF AUTOMATIC CARDIOVERTER/DEFIBRILLATOR (AICD): ICD-10-CM

## 2025-05-07 PROCEDURE — 93296 REM INTERROG EVL PM/IDS: CPT

## 2025-05-07 PROCEDURE — 93295 DEV INTERROG REMOTE 1/2/MLT: CPT | Performed by: INTERNAL MEDICINE

## 2025-05-12 ENCOUNTER — PATIENT MESSAGE (OUTPATIENT)
Dept: CARDIOLOGY | Facility: CLINIC | Age: 65
End: 2025-05-12
Payer: MEDICARE

## 2025-05-12 DIAGNOSIS — T78.2XXD ANAPHYLAXIS, SUBSEQUENT ENCOUNTER: Primary | ICD-10-CM

## 2025-05-12 RX ORDER — EPINEPHRINE 0.3 MG/.3ML
1 INJECTION SUBCUTANEOUS AS NEEDED
Qty: 2 EACH | Refills: 11 | Status: SHIPPED | OUTPATIENT
Start: 2025-05-12

## 2025-06-19 DIAGNOSIS — I50.22 CHRONIC SYSTOLIC CONGESTIVE HEART FAILURE: ICD-10-CM

## 2025-06-19 RX ORDER — FUROSEMIDE 40 MG/1
40 TABLET ORAL DAILY
Qty: 90 TABLET | Refills: 3 | Status: SHIPPED | OUTPATIENT
Start: 2025-06-19 | End: 2026-06-19

## 2025-06-19 RX ORDER — SPIRONOLACTONE 25 MG/1
25 TABLET ORAL DAILY
Qty: 90 TABLET | Refills: 3 | Status: SHIPPED | OUTPATIENT
Start: 2025-06-19 | End: 2026-06-19

## 2025-07-18 ENCOUNTER — OFFICE VISIT (OUTPATIENT)
Dept: PRIMARY CARE | Facility: CLINIC | Age: 65
End: 2025-07-18
Payer: MEDICARE

## 2025-07-18 VITALS
WEIGHT: 178.6 LBS | SYSTOLIC BLOOD PRESSURE: 102 MMHG | DIASTOLIC BLOOD PRESSURE: 80 MMHG | TEMPERATURE: 98 F | RESPIRATION RATE: 16 BRPM | HEART RATE: 82 BPM | OXYGEN SATURATION: 96 % | BODY MASS INDEX: 36.07 KG/M2

## 2025-07-18 DIAGNOSIS — R39.15 URGENCY OF URINATION: ICD-10-CM

## 2025-07-18 DIAGNOSIS — R35.0 FREQUENCY OF URINATION: ICD-10-CM

## 2025-07-18 DIAGNOSIS — R39.9 SYMPTOMS OF URINARY TRACT INFECTION: Primary | ICD-10-CM

## 2025-07-18 DIAGNOSIS — E66.812 CLASS 2 SEVERE OBESITY WITH SERIOUS COMORBIDITY AND BODY MASS INDEX (BMI) OF 36.0 TO 36.9 IN ADULT, UNSPECIFIED OBESITY TYPE: ICD-10-CM

## 2025-07-18 DIAGNOSIS — E66.01 CLASS 2 SEVERE OBESITY WITH SERIOUS COMORBIDITY AND BODY MASS INDEX (BMI) OF 36.0 TO 36.9 IN ADULT, UNSPECIFIED OBESITY TYPE: ICD-10-CM

## 2025-07-18 LAB
POC APPEARANCE, URINE: CLEAR
POC BILIRUBIN, URINE: NEGATIVE
POC BLOOD, URINE: NEGATIVE
POC COLOR, URINE: ABNORMAL
POC GLUCOSE, URINE: ABNORMAL MG/DL
POC KETONES, URINE: NEGATIVE MG/DL
POC LEUKOCYTES, URINE: NEGATIVE
POC NITRITE,URINE: NEGATIVE
POC PH, URINE: 5.5 PH
POC PROTEIN, URINE: NEGATIVE MG/DL
POC SPECIFIC GRAVITY, URINE: 1.01
POC UROBILINOGEN, URINE: 0.2 EU/DL

## 2025-07-18 PROCEDURE — 99213 OFFICE O/P EST LOW 20 MIN: CPT | Performed by: NURSE PRACTITIONER

## 2025-07-18 PROCEDURE — 81003 URINALYSIS AUTO W/O SCOPE: CPT | Performed by: NURSE PRACTITIONER

## 2025-07-18 RX ORDER — NITROFURANTOIN 25; 75 MG/1; MG/1
100 CAPSULE ORAL 2 TIMES DAILY
Qty: 14 CAPSULE | Refills: 0 | Status: SHIPPED | OUTPATIENT
Start: 2025-07-18 | End: 2025-07-25

## 2025-07-18 ASSESSMENT — PATIENT HEALTH QUESTIONNAIRE - PHQ9
2. FEELING DOWN, DEPRESSED OR HOPELESS: NOT AT ALL
SUM OF ALL RESPONSES TO PHQ9 QUESTIONS 1 AND 2: 0
2. FEELING DOWN, DEPRESSED OR HOPELESS: NOT AT ALL
1. LITTLE INTEREST OR PLEASURE IN DOING THINGS: NOT AT ALL
1. LITTLE INTEREST OR PLEASURE IN DOING THINGS: NOT AT ALL
SUM OF ALL RESPONSES TO PHQ9 QUESTIONS 1 AND 2: 0

## 2025-07-18 ASSESSMENT — ENCOUNTER SYMPTOMS
DEPRESSION: 0
LOSS OF SENSATION IN FEET: 0
OCCASIONAL FEELINGS OF UNSTEADINESS: 0

## 2025-07-18 ASSESSMENT — PAIN SCALES - GENERAL: PAINLEVEL_OUTOF10: 0-NO PAIN

## 2025-07-18 NOTE — PROGRESS NOTES
Subjective   Patient ID: Sandee York is a 65 y.o. female who is with complaint of symptoms of UTI.    HPI   Patient is a 65 y.o. female who CONSULTED AT [facility] today. Patient is with symptoms of increased urinary frequency, urgency of urination, mild sensation of inadequate emptying post voiding, nocturia, lower abdominal discomfort (dysuria), intermittent left sided lower back pain, and bad smell of urine. She denies having any burning sensation on urination, flank pain, incontinence, cloudy urine, blood in urine, nausea, vomiting, chills, nor fever. Patient states symptoms has been going on for 6 days. Patient has taken AZO medication for relief of symptoms but it only relieved the symptoms temporarily.      Review of Systems  General: no weight loss, generally healthy, no fatigue  Head:  no headaches / sinus pain, no vertigo, no injury  Eyes: no diplopia, no tearing, no pain,   Ears: no change in hearing, no tinnitus, no bleeding, no vertigo  Mouth:  no dental difficulties, no gingival bleeding, no sore throat, no loss of sense of taste  Nose: no congestion, no  discharge, no bleeding, no obstruction, no loss of sense of smell  Neck: no stiffness, no pain, no tenderness, no masses, no bruit  Pulmonary: no dyspnea, no wheezing, no hemoptysis, no cough  Cardiovascular: no chest pain, no palpitations, no syncope, no orthopnea  Gastrointestinal: no change in appetite, no dysphagia, no abdominal pains, no diarrhea, no emesis, no melena  Genito Urinary: (+) increased urinary frequency, (+) urgency of urination, (+) mild sensation of inadequate emptying post voiding, (+) nocturia, (+) lower abdominal discomfort (dysuria), (+) intermittent left sided lower back pain, (+) bad smell of urine, no burning sensation on urination, no flank pain, no incontinence, no cloudy urine, no blood in urine,   Musculoskeletal: no muscle ache, no joint pain, no limitation of range of motion, no  paresthesia, no numbness  Constitutional: no fever, no chills, no night sweats    Objective   /80   Pulse 82   Temp 36.7 °C (98 °F)   Resp 16   Wt 81 kg (178 lb 9.6 oz)   SpO2 96%   BMI 36.07 kg/m²     Physical Exam  General: ambulatory, in no acute distress  Head: normocephalic, no lesions  Eyes: pink palpebral conjunctiva, anicteric sclerae, PERRLA, EOM's full  Ears: clear external auditory canals, no ear discharge, no bleeding from the ears, tympanic membrane intact  Nose: nasal mucosa normal, no nasal discharge, no bleeding, no obstruction  Throat: clear, no exudate, no lesions  Neck: supple, no masses, no bruits  Chest: symmetrical chest expansion, no lagging, no retractions, clear breath sounds, no rales, no wheezes  Heart: normal rate, regular rhythm, no heaves, no thrills, no murmurs  Abdomen: flat, NABS, soft, no direct tenderness, no rebound tenderness, no mass palpated, SIGNS: no L[place], no Rovsings, no Psoas, no Obturator; No CVA tenderness,    Assessment/Plan   Problem List Items Addressed This Visit    None  Visit Diagnoses         Codes      Symptoms of urinary tract infection    -  Primary R39.9    Relevant Medications    nitrofurantoin, macrocrystal-monohydrate, (Macrobid) 100 mg capsule    Other Relevant Orders    POCT UA Automated manually resulted (Completed)    Urine Culture      Urgency of urination     R39.15    Relevant Medications    nitrofurantoin, macrocrystal-monohydrate, (Macrobid) 100 mg capsule    Other Relevant Orders    POCT UA Automated manually resulted (Completed)    Urine Culture      Frequency of urination     R35.0    Relevant Medications    nitrofurantoin, macrocrystal-monohydrate, (Macrobid) 100 mg capsule    Other Relevant Orders    POCT UA Automated manually resulted (Completed)    Urine Culture      BMI 36.0-36.9,adult     Z68.36      Class 2 severe obesity with serious comorbidity and body mass index (BMI) of 36.0 to 36.9 in adult, unspecified obesity type      E66.812, Z68.36, E66.01        Urinalysis was done at the office today. Urinalysis result explained and discussed with patient. Urine sample submitted to laboratory for culture and sensitivity study. The laboratory examination requested were explained and discussed with patient.    DISCHARGE SUMMARY:   Patient seen and examined. Probable diagnosis, differential diagnosis, treatment, treatment options, and probable complications were discussed and explained to patient. she was to take medication/s associated with this visit. she may take over-the-counter pain and/or fever medication if needed. Advised increased oral fluid intake (2 liters of water or more per day). Reinforced to continue personal hygiene. Patient to return to clinic if there is worsening or persistence of symptoms. Patient verbalized understanding.    Patient to come back in 7 - 10 days if needed for worsening symptoms.

## 2025-07-18 NOTE — PROGRESS NOTES
Subjective   Patient ID: Sandee York is a 65 y.o. female who presents for UTI.        Symptoms:   pressure, frequency and urgency. Left flank pain.  Length of symptoms: 2 days ago  OTC: azo with mild help.  Related information:    HPI     Review of Systems    Objective   /80   Pulse 82   Temp 36.7 °C (98 °F)   Resp 16   Wt 81 kg (178 lb 9.6 oz)   SpO2 96%   BMI 36.07 kg/m²     Physical Exam    Assessment/Plan

## 2025-07-20 LAB — BACTERIA UR CULT: ABNORMAL

## 2025-07-21 ENCOUNTER — DOCUMENTATION (OUTPATIENT)
Dept: PRIMARY CARE | Facility: CLINIC | Age: 65
End: 2025-07-21
Payer: MEDICARE

## 2025-07-21 LAB — BACTERIA UR CULT: ABNORMAL

## 2025-08-13 ENCOUNTER — HOSPITAL ENCOUNTER (OUTPATIENT)
Dept: CARDIOLOGY | Facility: HOSPITAL | Age: 65
Discharge: HOME | End: 2025-08-13
Payer: MEDICARE

## 2025-08-13 DIAGNOSIS — Z95.810 PRESENCE OF AUTOMATIC CARDIOVERTER/DEFIBRILLATOR (AICD): ICD-10-CM

## 2025-08-13 DIAGNOSIS — I42.9 CARDIOMYOPATHY, UNSPECIFIED TYPE (MULTI): ICD-10-CM

## 2025-08-13 PROCEDURE — 93296 REM INTERROG EVL PM/IDS: CPT

## 2025-11-06 ENCOUNTER — APPOINTMENT (OUTPATIENT)
Dept: CARDIOLOGY | Facility: CLINIC | Age: 65
End: 2025-11-06
Payer: MEDICARE

## 2025-11-10 ENCOUNTER — APPOINTMENT (OUTPATIENT)
Dept: CARDIOLOGY | Facility: CLINIC | Age: 65
End: 2025-11-10
Payer: MEDICARE